# Patient Record
Sex: FEMALE | Race: WHITE | Employment: FULL TIME | URBAN - METROPOLITAN AREA
[De-identification: names, ages, dates, MRNs, and addresses within clinical notes are randomized per-mention and may not be internally consistent; named-entity substitution may affect disease eponyms.]

---

## 2017-01-11 RX ORDER — SERTRALINE HYDROCHLORIDE 25 MG/1
25 TABLET, FILM COATED ORAL DAILY
Qty: 30 TABLET | Refills: 0 | Status: SHIPPED | OUTPATIENT
Start: 2017-01-11 | End: 2017-03-06

## 2017-01-11 RX ORDER — LEVOTHYROXINE SODIUM 0.1 MG/1
100 TABLET ORAL
Qty: 30 TABLET | Refills: 0 | Status: SHIPPED | OUTPATIENT
Start: 2017-01-11 | End: 2017-01-17 | Stop reason: DRUGHIGH

## 2017-01-11 RX ORDER — SIMVASTATIN 40 MG
60 TABLET ORAL NIGHTLY
Qty: 45 TABLET | Refills: 0 | Status: SHIPPED | OUTPATIENT
Start: 2017-01-11 | End: 2017-01-17

## 2017-01-11 NOTE — TELEPHONE ENCOUNTER
From: Terri Alfred  To: Vika Batista MD  Sent: 1/11/2017 11:40 AM CST  Subject: Medication Renewal Request    Original authorizing provider: MD Terri Peacock would like a refill of the following medications:  simvastatin 40 MG Ora

## 2017-01-12 ENCOUNTER — APPOINTMENT (OUTPATIENT)
Dept: LAB | Age: 57
End: 2017-01-12
Attending: INTERNAL MEDICINE
Payer: COMMERCIAL

## 2017-01-12 PROCEDURE — 36415 COLL VENOUS BLD VENIPUNCTURE: CPT | Performed by: INTERNAL MEDICINE

## 2017-01-12 PROCEDURE — 85025 COMPLETE CBC W/AUTO DIFF WBC: CPT | Performed by: INTERNAL MEDICINE

## 2017-01-12 PROCEDURE — 84443 ASSAY THYROID STIM HORMONE: CPT | Performed by: INTERNAL MEDICINE

## 2017-01-12 PROCEDURE — 80061 LIPID PANEL: CPT | Performed by: INTERNAL MEDICINE

## 2017-01-12 PROCEDURE — 81001 URINALYSIS AUTO W/SCOPE: CPT | Performed by: INTERNAL MEDICINE

## 2017-01-12 PROCEDURE — 80053 COMPREHEN METABOLIC PANEL: CPT | Performed by: INTERNAL MEDICINE

## 2017-01-15 ENCOUNTER — TELEPHONE (OUTPATIENT)
Dept: NEPHROLOGY | Facility: CLINIC | Age: 57
End: 2017-01-15

## 2017-01-15 DIAGNOSIS — E03.9 HYPOTHYROIDISM, UNSPECIFIED TYPE: Primary | ICD-10-CM

## 2017-01-15 DIAGNOSIS — E78.00 PURE HYPERCHOLESTEROLEMIA: ICD-10-CM

## 2017-01-15 NOTE — TELEPHONE ENCOUNTER
Labs are okay except thyroid is a little underactive and cholesterol is too high. Increase Synthroid to 112 µg, #30, refills 5.  DC simvastatin. Start Lipitor 40 mg 1 daily, #30, refills 5. TSH, liver and lipid panel in 1 month. Follow-up as scheduled.

## 2017-01-17 RX ORDER — LEVOTHYROXINE SODIUM 112 UG/1
112 TABLET ORAL
Qty: 30 TABLET | Refills: 5 | Status: SHIPPED | OUTPATIENT
Start: 2017-01-17 | End: 2017-03-06

## 2017-01-17 RX ORDER — ATORVASTATIN CALCIUM 40 MG/1
40 TABLET, FILM COATED ORAL NIGHTLY
Qty: 30 TABLET | Refills: 5 | Status: SHIPPED | OUTPATIENT
Start: 2017-01-17 | End: 2017-03-06

## 2017-01-17 NOTE — TELEPHONE ENCOUNTER
Patient returned call. Results message read. Patient is aware of dose increase for Levothyroxine to 112mcg. She also is aware that Dr. Kristy Fontenot is stopping Simvastatin and switching to Lipitor 40mg daily.  Lab work ordered and patient knows to do this lab wo

## 2017-01-25 ENCOUNTER — OFFICE VISIT (OUTPATIENT)
Dept: NEPHROLOGY | Facility: CLINIC | Age: 57
End: 2017-01-25

## 2017-01-25 VITALS
WEIGHT: 210.38 LBS | DIASTOLIC BLOOD PRESSURE: 74 MMHG | HEART RATE: 81 BPM | HEIGHT: 67 IN | BODY MASS INDEX: 33.02 KG/M2 | SYSTOLIC BLOOD PRESSURE: 107 MMHG

## 2017-01-25 DIAGNOSIS — Z00.00 ROUTINE GENERAL MEDICAL EXAMINATION AT HEALTH CARE FACILITY: ICD-10-CM

## 2017-01-25 DIAGNOSIS — E03.9 ACQUIRED HYPOTHYROIDISM: ICD-10-CM

## 2017-01-25 DIAGNOSIS — E78.00 HYPERCHOLESTEREMIA: ICD-10-CM

## 2017-01-25 DIAGNOSIS — E04.1 THYROID NODULE: Primary | ICD-10-CM

## 2017-01-25 PROCEDURE — 99215 OFFICE O/P EST HI 40 MIN: CPT | Performed by: INTERNAL MEDICINE

## 2017-01-25 PROCEDURE — 99212 OFFICE O/P EST SF 10 MIN: CPT | Performed by: INTERNAL MEDICINE

## 2017-01-26 NOTE — PROGRESS NOTES
HPI:    Patient ID: Winston Morales is a 61year old female.     HPI Comments: 48-year-old female with a history of hypothyroidism, hypercholesterolemia, anxiety, history of quiescent ulcerative colitis and a right thyroid nodule who is now here for follow-u Musculoskeletal: She exhibits no tenderness. Lymphadenopathy:     She has no cervical adenopathy. Neurological: She is alert and oriented to person, place, and time. Skin: Skin is warm and dry. Psychiatric: She has a normal mood and affect.  Her b

## 2017-02-04 ENCOUNTER — HOSPITAL ENCOUNTER (OUTPATIENT)
Dept: ULTRASOUND IMAGING | Age: 57
Discharge: HOME | End: 2017-02-04
Attending: INTERNAL MEDICINE
Payer: COMMERCIAL

## 2017-02-04 DIAGNOSIS — E04.1 THYROID NODULE: ICD-10-CM

## 2017-02-04 PROCEDURE — 76536 US EXAM OF HEAD AND NECK: CPT

## 2017-03-06 RX ORDER — SERTRALINE HYDROCHLORIDE 25 MG/1
25 TABLET, FILM COATED ORAL DAILY
Qty: 30 TABLET | Refills: 5 | Status: SHIPPED | OUTPATIENT
Start: 2017-03-06 | End: 2017-06-16

## 2017-03-06 RX ORDER — ATORVASTATIN CALCIUM 40 MG/1
40 TABLET, FILM COATED ORAL NIGHTLY
Qty: 30 TABLET | Refills: 5 | Status: SHIPPED | OUTPATIENT
Start: 2017-03-06 | End: 2017-06-16

## 2017-03-06 RX ORDER — LEVOTHYROXINE SODIUM 112 UG/1
112 TABLET ORAL
Qty: 30 TABLET | Refills: 5 | Status: SHIPPED | OUTPATIENT
Start: 2017-03-06 | End: 2017-06-16

## 2017-03-06 NOTE — TELEPHONE ENCOUNTER
From: Peter Tolliver  To: Melissa Martinez MD  Sent: 3/6/2017 12:43 PM CST  Subject: Medication Renewal Request    Original authorizing provider: MD Peter Damian would like a refill of the following medications:  Sertraline HCl (ZOLOFT

## 2017-05-28 ENCOUNTER — HOSPITAL ENCOUNTER (OUTPATIENT)
Facility: HOSPITAL | Age: 57
Setting detail: OBSERVATION
Discharge: HOME | End: 2017-05-29
Attending: EMERGENCY MEDICINE | Admitting: HOSPITALIST
Payer: COMMERCIAL

## 2017-05-28 ENCOUNTER — APPOINTMENT (OUTPATIENT)
Dept: GENERAL RADIOLOGY | Facility: HOSPITAL | Age: 57
End: 2017-05-28
Attending: EMERGENCY MEDICINE
Payer: COMMERCIAL

## 2017-05-28 DIAGNOSIS — R07.9 ACUTE CHEST PAIN: Primary | ICD-10-CM

## 2017-05-28 PROCEDURE — 71010 XR CHEST AP/PA (1 VIEW) (CPT=71010): CPT | Performed by: EMERGENCY MEDICINE

## 2017-05-28 PROCEDURE — 99219 INITIAL OBSERVATION CARE,LEVL II: CPT | Performed by: HOSPITALIST

## 2017-05-28 RX ORDER — ACETAMINOPHEN 325 MG/1
650 TABLET ORAL EVERY 6 HOURS PRN
Status: DISCONTINUED | OUTPATIENT
Start: 2017-05-28 | End: 2017-05-29

## 2017-05-28 RX ORDER — ATORVASTATIN CALCIUM 40 MG/1
40 TABLET, FILM COATED ORAL NIGHTLY
Status: DISCONTINUED | OUTPATIENT
Start: 2017-05-28 | End: 2017-05-29

## 2017-05-28 RX ORDER — PANTOPRAZOLE SODIUM 40 MG/1
40 TABLET, DELAYED RELEASE ORAL
Status: DISCONTINUED | OUTPATIENT
Start: 2017-05-29 | End: 2017-05-29

## 2017-05-28 RX ORDER — MAGNESIUM HYDROXIDE/ALUMINUM HYDROXICE/SIMETHICONE 120; 1200; 1200 MG/30ML; MG/30ML; MG/30ML
30 SUSPENSION ORAL 4 TIMES DAILY PRN
Status: DISCONTINUED | OUTPATIENT
Start: 2017-05-28 | End: 2017-05-29

## 2017-05-28 RX ORDER — ASPIRIN 325 MG
325 TABLET ORAL ONCE
Status: COMPLETED | OUTPATIENT
Start: 2017-05-28 | End: 2017-05-28

## 2017-05-28 RX ORDER — MORPHINE SULFATE 4 MG/ML
4 INJECTION, SOLUTION INTRAMUSCULAR; INTRAVENOUS EVERY 2 HOUR PRN
Status: DISCONTINUED | OUTPATIENT
Start: 2017-05-28 | End: 2017-05-29

## 2017-05-28 RX ORDER — LEVOTHYROXINE SODIUM 112 UG/1
112 TABLET ORAL
Status: DISCONTINUED | OUTPATIENT
Start: 2017-05-29 | End: 2017-05-29

## 2017-05-28 RX ORDER — ONDANSETRON 2 MG/ML
4 INJECTION INTRAMUSCULAR; INTRAVENOUS EVERY 6 HOURS PRN
Status: DISCONTINUED | OUTPATIENT
Start: 2017-05-28 | End: 2017-05-29

## 2017-05-28 RX ORDER — MORPHINE SULFATE 2 MG/ML
2 INJECTION, SOLUTION INTRAMUSCULAR; INTRAVENOUS EVERY 2 HOUR PRN
Status: DISCONTINUED | OUTPATIENT
Start: 2017-05-28 | End: 2017-05-29

## 2017-05-28 RX ORDER — ENOXAPARIN SODIUM 100 MG/ML
40 INJECTION SUBCUTANEOUS DAILY
Status: DISCONTINUED | OUTPATIENT
Start: 2017-05-28 | End: 2017-05-29

## 2017-05-28 RX ORDER — HYDROCODONE BITARTRATE AND ACETAMINOPHEN 5; 325 MG/1; MG/1
1 TABLET ORAL EVERY 6 HOURS PRN
Status: DISCONTINUED | OUTPATIENT
Start: 2017-05-28 | End: 2017-05-29

## 2017-05-28 NOTE — ED INITIAL ASSESSMENT (HPI)
Pt also complains of shortness of breath while going up the stairs. At triage she complained of some discomfort over the right neck area.

## 2017-05-28 NOTE — PLAN OF CARE
Patient/Family Goals    • Patient/Family Short Term Goal Progressing        Pt oriented to room; call light in reach; reviewed POC and medications with pt; pt up ambulating; denies any pain at this time.  Will continue to monitor

## 2017-05-28 NOTE — ED INITIAL ASSESSMENT (HPI)
Pt states that 3 days ago while cutting grass she had a sudden onset of palpitations with associated tiredness that went away. This happened again the day after while gardening. The same complaints happened today while doing the laundry.

## 2017-05-28 NOTE — PROGRESS NOTES
Pt seen in the ED rm 21. Admission process started. Floor RN to F/U with skin check and admission orders. 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    Pt began with s/s starting on Friday with intermittent chest discomfort, sob,

## 2017-05-28 NOTE — H&P
[facility] [place] [facility] - [facility]    History & Physical    Sergei Balderas Patient Status:  Emergency    10/17/1960 MRN [id number]   Location [address] Attending Leanna Ahuja MD   Hosp Day # 0 PCP Shoshana Wiley MD     Date:  20 Maternal Grandmother    • Other[other] [OTHER] Maternal Grandfather    • Other[other] [OTHER] Paternal Grandmother    • Other[other] [OTHER] Paternal Grandfather       reports that she has never smoked.  She does not have any smokeless tobacco history on fi lymphadenopathy, no thyromegaly. Respiratory:  Lungs are clear to auscultation, respirations are non-labored, breath sounds are equal, symmetrical chest wall expansion. Cardiovascular:  Normal rate, regular rhythm, no murmur, no edema.   Gastrointestinal:

## 2017-05-28 NOTE — ED PROVIDER NOTES
Patient Seen in: [facility] AND [facility] Emergency Department    History   Patient presents with:  Chest Pain Angina (cardiovascular)    Stated Complaint:     HPI    Patient is a 25-year-old female that states for the last 3 days when she exerts herself she ge Father 39     Cardiac stents   • Crohn's Disease Mother       at age 61 (cause of death)   • Thyroid disease Other      close relative   • Heart Disease Other      Close relative   • Other[other] [OTHER] Maternal Grandmother    • Michael PEARL Musculoskeletal: Normal range of motion. Exhibits no edema or tenderness. Lymphadenopathy: No cervical adenopathy. Neurological: Alert and oriented to person, place, and time. Normal reflexes. No cranial nerve deficit.  No motor os sensory defecits no specified.     Medications Prescribed:  Current Discharge Medication List        Present on Admission  Date Reviewed: 9/19/2014          ICD-10-CM Noted POA    Acute chest pain R07.9 5/28/2017 Unknown

## 2017-05-29 ENCOUNTER — APPOINTMENT (OUTPATIENT)
Dept: CV DIAGNOSTICS | Facility: HOSPITAL | Age: 57
End: 2017-05-29
Attending: HOSPITALIST
Payer: COMMERCIAL

## 2017-05-29 ENCOUNTER — APPOINTMENT (OUTPATIENT)
Dept: NUCLEAR MEDICINE | Facility: HOSPITAL | Age: 57
End: 2017-05-29
Attending: HOSPITALIST
Payer: COMMERCIAL

## 2017-05-29 VITALS
BODY MASS INDEX: 32.33 KG/M2 | RESPIRATION RATE: 16 BRPM | SYSTOLIC BLOOD PRESSURE: 134 MMHG | HEIGHT: 67 IN | HEART RATE: 91 BPM | WEIGHT: 206 LBS | TEMPERATURE: 98 F | DIASTOLIC BLOOD PRESSURE: 79 MMHG | OXYGEN SATURATION: 99 %

## 2017-05-29 PROCEDURE — 93017 CV STRESS TEST TRACING ONLY: CPT | Performed by: HOSPITALIST

## 2017-05-29 PROCEDURE — 78452 HT MUSCLE IMAGE SPECT MULT: CPT | Performed by: HOSPITALIST

## 2017-05-29 RX ORDER — 0.9 % SODIUM CHLORIDE 0.9 %
VIAL (ML) INJECTION
Status: DISCONTINUED
Start: 2017-05-29 | End: 2017-05-29

## 2017-05-29 RX ORDER — 0.9 % SODIUM CHLORIDE 0.9 %
VIAL (ML) INJECTION
Status: COMPLETED
Start: 2017-05-29 | End: 2017-05-29

## 2017-05-30 ENCOUNTER — TELEPHONE (OUTPATIENT)
Dept: INTERNAL MEDICINE UNIT | Facility: HOSPITAL | Age: 57
End: 2017-05-30

## 2017-05-30 NOTE — TELEPHONE ENCOUNTER
LMTCB. PAAs--please assist patient to schedule a hospital follow up appointment with Dr. Madisyn Smith if she calls back. Ok to use any available time slot.

## 2017-05-30 NOTE — TELEPHONE ENCOUNTER
Patient discharged from [facility] on May 29, 2017. Please call patient to schedule hospital follow-up appointment with PCP, Dr. Rosibel Tirado.

## 2017-06-02 NOTE — TELEPHONE ENCOUNTER
LMTCB. PAAs--see message below. Please assist patient to schedule a hospital follow up visit with Dr. Jennifer Viera.

## 2017-06-09 ENCOUNTER — OFFICE VISIT (OUTPATIENT)
Dept: NEPHROLOGY | Facility: CLINIC | Age: 57
End: 2017-06-09

## 2017-06-09 VITALS
DIASTOLIC BLOOD PRESSURE: 81 MMHG | BODY MASS INDEX: 33 KG/M2 | WEIGHT: 208 LBS | SYSTOLIC BLOOD PRESSURE: 132 MMHG | HEART RATE: 82 BPM | RESPIRATION RATE: 20 BRPM

## 2017-06-09 DIAGNOSIS — J30.2 SEASONAL ALLERGIC RHINITIS, UNSPECIFIED ALLERGIC RHINITIS TRIGGER: ICD-10-CM

## 2017-06-09 DIAGNOSIS — E78.00 HYPERCHOLESTEREMIA: Primary | ICD-10-CM

## 2017-06-09 DIAGNOSIS — E03.9 ACQUIRED HYPOTHYROIDISM: ICD-10-CM

## 2017-06-09 PROCEDURE — 99214 OFFICE O/P EST MOD 30 MIN: CPT | Performed by: INTERNAL MEDICINE

## 2017-06-09 PROCEDURE — 99212 OFFICE O/P EST SF 10 MIN: CPT | Performed by: INTERNAL MEDICINE

## 2017-06-09 RX ORDER — ALBUTEROL SULFATE 90 UG/1
2 AEROSOL, METERED RESPIRATORY (INHALATION) EVERY 6 HOURS PRN
Qty: 1 INHALER | Refills: 2 | Status: SHIPPED | OUTPATIENT
Start: 2017-06-09 | End: 2019-06-21

## 2017-06-09 NOTE — PROGRESS NOTES
HPI:    Patient ID: Jayna Eagle is a 61year old female.     HPI Comments: 50-year-old female with a history of hypothyroidism, hypercholesterolemia, anxiety, history of quiescent ulcerative colitis and a right thyroid nodule who is now here for follow-u Disp: 30 tablet Rfl: 5   Multiple Vitamin (MULTIVITAMINS OR) Take 500 tablets by mouth.  Disp:  Rfl:      Allergies:  Penicillin G Procai*    Anaphylaxis  Tetanus-Diphtheria *    Palpitations   PHYSICAL EXAM:   Physical Exam    Constitutional: She appears w

## 2017-06-16 RX ORDER — ATORVASTATIN CALCIUM 40 MG/1
40 TABLET, FILM COATED ORAL NIGHTLY
Qty: 30 TABLET | Refills: 5 | Status: SHIPPED | OUTPATIENT
Start: 2017-06-16 | End: 2017-09-28

## 2017-06-16 RX ORDER — SERTRALINE HYDROCHLORIDE 25 MG/1
25 TABLET, FILM COATED ORAL DAILY
Qty: 30 TABLET | Refills: 5 | Status: SHIPPED | OUTPATIENT
Start: 2017-06-16 | End: 2017-09-28

## 2017-06-16 RX ORDER — LEVOTHYROXINE SODIUM 112 UG/1
112 TABLET ORAL
Qty: 30 TABLET | Refills: 5 | Status: SHIPPED | OUTPATIENT
Start: 2017-06-16 | End: 2017-09-28

## 2017-06-16 NOTE — TELEPHONE ENCOUNTER
From: Aris Miller  To: Ashley Brown MD  Sent: 6/16/2017 10:24 AM CDT  Subject: Medication Renewal Request    Original authorizing provider: MD Aris Bear would like a refill of the following medications:  Sertraline HCl (Deepti Stain

## 2017-09-08 ENCOUNTER — APPOINTMENT (OUTPATIENT)
Dept: LAB | Age: 57
End: 2017-09-08
Attending: INTERNAL MEDICINE
Payer: COMMERCIAL

## 2017-09-08 DIAGNOSIS — E78.00 HYPERCHOLESTEREMIA: ICD-10-CM

## 2017-09-08 LAB
ALBUMIN SERPL BCP-MCNC: 4.2 G/DL (ref 3.5–4.8)
ALBUMIN/GLOB SERPL: 1.4 {RATIO} (ref 1–2)
ALP SERPL-CCNC: 81 U/L (ref 32–100)
ALT SERPL-CCNC: 31 U/L (ref 14–54)
ANION GAP SERPL CALC-SCNC: 8 MMOL/L (ref 0–18)
AST SERPL-CCNC: 26 U/L (ref 15–41)
BILIRUB SERPL-MCNC: 1.2 MG/DL (ref 0.3–1.2)
BUN SERPL-MCNC: 9 MG/DL (ref 8–20)
BUN/CREAT SERPL: 12 (ref 10–20)
CALCIUM SERPL-MCNC: 9.5 MG/DL (ref 8.5–10.5)
CHLORIDE SERPL-SCNC: 106 MMOL/L (ref 95–110)
CHOLEST SERPL-MCNC: 181 MG/DL (ref 110–200)
CO2 SERPL-SCNC: 25 MMOL/L (ref 22–32)
CREAT SERPL-MCNC: 0.75 MG/DL (ref 0.5–1.5)
GLOBULIN PLAS-MCNC: 3 G/DL (ref 2.5–3.7)
GLUCOSE SERPL-MCNC: 87 MG/DL (ref 70–99)
HDLC SERPL-MCNC: 49 MG/DL
LDLC SERPL CALC-MCNC: 81 MG/DL (ref 0–99)
NONHDLC SERPL-MCNC: 132 MG/DL
OSMOLALITY UR CALC.SUM OF ELEC: 286 MOSM/KG (ref 275–295)
POTASSIUM SERPL-SCNC: 4 MMOL/L (ref 3.3–5.1)
PROT SERPL-MCNC: 7.2 G/DL (ref 5.9–8.4)
SODIUM SERPL-SCNC: 139 MMOL/L (ref 136–144)
TRIGL SERPL-MCNC: 255 MG/DL (ref 1–149)

## 2017-09-08 PROCEDURE — 36415 COLL VENOUS BLD VENIPUNCTURE: CPT

## 2017-09-08 PROCEDURE — 80053 COMPREHEN METABOLIC PANEL: CPT

## 2017-09-08 PROCEDURE — 80061 LIPID PANEL: CPT

## 2017-09-28 RX ORDER — ATORVASTATIN CALCIUM 40 MG/1
40 TABLET, FILM COATED ORAL NIGHTLY
Qty: 30 TABLET | Refills: 5 | Status: SHIPPED
Start: 2017-09-28 | End: 2017-10-06

## 2017-09-28 RX ORDER — SERTRALINE HYDROCHLORIDE 25 MG/1
25 TABLET, FILM COATED ORAL DAILY
Qty: 30 TABLET | Refills: 5 | Status: SHIPPED
Start: 2017-09-28 | End: 2017-10-06

## 2017-09-28 RX ORDER — LEVOTHYROXINE SODIUM 112 UG/1
112 TABLET ORAL
Qty: 30 TABLET | Refills: 5 | Status: SHIPPED
Start: 2017-09-28 | End: 2017-10-06

## 2017-09-28 NOTE — TELEPHONE ENCOUNTER
From: Audrey Lin  Sent: 9/28/2017 3:42 PM CDT  Subject: Medication Renewal Request    Lindsey Travis would like a refill of the following medications:     Sertraline HCl (ZOLOFT) 25 MG Oral Tab Kay Pimentel MD]   Patient Comment: 90 day supply

## 2017-10-06 RX ORDER — ATORVASTATIN CALCIUM 40 MG/1
40 TABLET, FILM COATED ORAL NIGHTLY
Qty: 90 TABLET | Refills: 0 | Status: SHIPPED | OUTPATIENT
Start: 2017-10-06 | End: 2018-03-27

## 2017-10-06 RX ORDER — SERTRALINE HYDROCHLORIDE 25 MG/1
25 TABLET, FILM COATED ORAL DAILY
Qty: 90 TABLET | Refills: 0 | Status: SHIPPED | OUTPATIENT
Start: 2017-10-06 | End: 2018-03-27

## 2017-10-06 RX ORDER — LEVOTHYROXINE SODIUM 112 UG/1
112 TABLET ORAL
Qty: 90 TABLET | Refills: 0 | Status: SHIPPED | OUTPATIENT
Start: 2017-10-06 | End: 2018-03-27

## 2017-10-06 NOTE — TELEPHONE ENCOUNTER
Patient requesting a 90 days supply    Current Outpatient Prescriptions:  Sertraline HCl (ZOLOFT) 25 MG Oral Tab Take 1 tablet (25 mg total) by mouth daily. Disp: 30 tablet Rfl: 5   atorvastatin 40 MG Oral Tab Take 1 tablet (40 mg total) by mouth nightly.

## 2017-10-06 NOTE — TELEPHONE ENCOUNTER
Refilled on 9/28/17 but patient is requesting a 90 day supply. Originally refilled for #30 + 5 refills.

## 2018-03-27 DIAGNOSIS — E78.00 PURE HYPERCHOLESTEROLEMIA: Primary | ICD-10-CM

## 2018-03-27 DIAGNOSIS — E03.9 ACQUIRED HYPOTHYROIDISM: ICD-10-CM

## 2018-03-28 RX ORDER — SERTRALINE HYDROCHLORIDE 25 MG/1
25 TABLET, FILM COATED ORAL DAILY
Qty: 90 TABLET | Refills: 0 | Status: SHIPPED | OUTPATIENT
Start: 2018-03-28 | End: 2018-08-15

## 2018-03-28 RX ORDER — ATORVASTATIN CALCIUM 40 MG/1
40 TABLET, FILM COATED ORAL NIGHTLY
Qty: 90 TABLET | Refills: 0 | Status: SHIPPED | OUTPATIENT
Start: 2018-03-28 | End: 2018-08-15

## 2018-03-28 RX ORDER — LEVOTHYROXINE SODIUM 112 UG/1
112 TABLET ORAL
Qty: 90 TABLET | Refills: 0 | Status: SHIPPED | OUTPATIENT
Start: 2018-03-28 | End: 2018-08-15

## 2018-03-28 NOTE — TELEPHONE ENCOUNTER
From: March Nancy  Sent: 3/27/2018 9:54 PM CDT  Subject: Medication Renewal Request    Lindsey Travis would like a refill of the following medications:     Levothyroxine Sodium 112 MCG Oral Tab Darwin Linn MD]     atorvastatin 40 MG Oral Tab [facility]

## 2018-03-29 NOTE — TELEPHONE ENCOUNTER
Patient contacted. Advised of lab work ordered by Dr. Heather Luna (instructed to fast 12 hours) Patient is aware to schedule a follow up but will call after she does lab work as she has to coordinate her work schedule with Dr. Codie Cuevas schedule.

## 2018-07-05 ENCOUNTER — TELEPHONE (OUTPATIENT)
Dept: OPHTHALMOLOGY | Facility: CLINIC | Age: 58
End: 2018-07-05

## 2018-07-05 ENCOUNTER — TELEPHONE (OUTPATIENT)
Dept: NEPHROLOGY | Facility: CLINIC | Age: 58
End: 2018-07-05

## 2018-07-05 DIAGNOSIS — H33.20: Primary | ICD-10-CM

## 2018-07-05 NOTE — TELEPHONE ENCOUNTER
Pt calling to schedule appointment for detached retina. Offered pt next available on 7/7. Pt declined states she does not want to wait that long. Please advise. Thank you.

## 2018-07-05 NOTE — TELEPHONE ENCOUNTER
Pt called back. The doctor she originally requested is on vacation so she's going to look into who else is in her network that specializes in retinas. Still waiting for approval from [address].

## 2018-07-05 NOTE — TELEPHONE ENCOUNTER
The referral was sent to Dr. Sharon Castro as high priority. [facility] will get insurance authorization and process it once Dr. Sharon Castro signs it.

## 2018-07-05 NOTE — TELEPHONE ENCOUNTER
Patient talked to Dr. Wagner Moon and he told her to see Dr. Karen Groves. Referral sent as STAT. Appointment is scheduled for tomorrow (Friday) 7/6/18. She states she has about 30 floaters in her eye and a dark veil over her eye. Referral sent as STAT.

## 2018-07-05 NOTE — TELEPHONE ENCOUNTER
OK but how does she know she has a detached retina. She should not wait too long looking around for an eye doctor. Can refer to Dr. Rachel Alas or his grp if covered by her insurance.

## 2018-07-05 NOTE — TELEPHONE ENCOUNTER
Pt called to request referral to Dr. Eldon Santiago due to detached retina. No appt scheduled. Please call. Aware [facility] out of office today.

## 2018-07-20 ENCOUNTER — LAB ENCOUNTER (OUTPATIENT)
Dept: LAB | Age: 58
End: 2018-07-20
Attending: INTERNAL MEDICINE
Payer: COMMERCIAL

## 2018-07-20 DIAGNOSIS — E78.00 PURE HYPERCHOLESTEROLEMIA: ICD-10-CM

## 2018-07-20 DIAGNOSIS — E03.9 ACQUIRED HYPOTHYROIDISM: ICD-10-CM

## 2018-07-20 LAB
ALBUMIN SERPL BCP-MCNC: 4.3 G/DL (ref 3.5–4.8)
ALBUMIN/GLOB SERPL: 1.4 {RATIO} (ref 1–2)
ALP SERPL-CCNC: 82 U/L (ref 32–100)
ALT SERPL-CCNC: 30 U/L (ref 14–54)
ANION GAP SERPL CALC-SCNC: 7 MMOL/L (ref 0–18)
AST SERPL-CCNC: 24 U/L (ref 15–41)
BASOPHILS # BLD: 0.1 K/UL (ref 0–0.2)
BASOPHILS NFR BLD: 1 %
BILIRUB SERPL-MCNC: 1.2 MG/DL (ref 0.3–1.2)
BILIRUB UR QL: NEGATIVE
BUN SERPL-MCNC: 8 MG/DL (ref 8–20)
BUN/CREAT SERPL: 10.5 (ref 10–20)
CALCIUM SERPL-MCNC: 10 MG/DL (ref 8.5–10.5)
CHLORIDE SERPL-SCNC: 105 MMOL/L (ref 95–110)
CHOLEST SERPL-MCNC: 180 MG/DL (ref 110–200)
CO2 SERPL-SCNC: 27 MMOL/L (ref 22–32)
COLOR UR: YELLOW
CREAT SERPL-MCNC: 0.76 MG/DL (ref 0.5–1.5)
EOSINOPHIL # BLD: 0.1 K/UL (ref 0–0.7)
EOSINOPHIL NFR BLD: 2 %
ERYTHROCYTE [DISTWIDTH] IN BLOOD BY AUTOMATED COUNT: 13.7 % (ref 11–15)
GLOBULIN PLAS-MCNC: 3 G/DL (ref 2.5–3.7)
GLUCOSE SERPL-MCNC: 87 MG/DL (ref 70–99)
GLUCOSE UR-MCNC: NEGATIVE MG/DL
HCT VFR BLD AUTO: 44.4 % (ref 35–48)
HDLC SERPL-MCNC: 57 MG/DL
HGB BLD-MCNC: 14.7 G/DL (ref 12–16)
KETONES UR-MCNC: NEGATIVE MG/DL
LDLC SERPL CALC-MCNC: 82 MG/DL (ref 0–99)
LYMPHOCYTES # BLD: 1.9 K/UL (ref 1–4)
LYMPHOCYTES NFR BLD: 23 %
MCH RBC QN AUTO: 30.1 PG (ref 27–32)
MCHC RBC AUTO-ENTMCNC: 33.1 G/DL (ref 32–37)
MCV RBC AUTO: 91 FL (ref 80–100)
MONOCYTES # BLD: 0.6 K/UL (ref 0–1)
MONOCYTES NFR BLD: 8 %
NEUTROPHILS # BLD AUTO: 5.5 K/UL (ref 1.8–7.7)
NEUTROPHILS NFR BLD: 67 %
NITRITE UR QL STRIP.AUTO: NEGATIVE
NONHDLC SERPL-MCNC: 123 MG/DL
OSMOLALITY UR CALC.SUM OF ELEC: 286 MOSM/KG (ref 275–295)
PATIENT FASTING: YES
PH UR: 7 [PH] (ref 5–8)
PLATELET # BLD AUTO: 364 K/UL (ref 140–400)
PMV BLD AUTO: 8.4 FL (ref 7.4–10.3)
POTASSIUM SERPL-SCNC: 4.3 MMOL/L (ref 3.3–5.1)
PROT SERPL-MCNC: 7.3 G/DL (ref 5.9–8.4)
PROT UR-MCNC: NEGATIVE MG/DL
RBC # BLD AUTO: 4.88 M/UL (ref 3.7–5.4)
RBC #/AREA URNS AUTO: 2 /HPF
SODIUM SERPL-SCNC: 139 MMOL/L (ref 136–144)
SP GR UR STRIP: 1.01 (ref 1–1.03)
TRIGL SERPL-MCNC: 206 MG/DL (ref 1–149)
TSH SERPL-ACNC: 4.17 UIU/ML (ref 0.45–5.33)
UROBILINOGEN UR STRIP-ACNC: <2
VIT C UR-MCNC: NEGATIVE MG/DL
WBC # BLD AUTO: 8.2 K/UL (ref 4–11)
WBC #/AREA URNS AUTO: 15 /HPF

## 2018-07-20 PROCEDURE — 84443 ASSAY THYROID STIM HORMONE: CPT

## 2018-07-20 PROCEDURE — 81001 URINALYSIS AUTO W/SCOPE: CPT

## 2018-07-20 PROCEDURE — 80061 LIPID PANEL: CPT

## 2018-07-20 PROCEDURE — 80053 COMPREHEN METABOLIC PANEL: CPT

## 2018-07-20 PROCEDURE — 85025 COMPLETE CBC W/AUTO DIFF WBC: CPT

## 2018-07-20 PROCEDURE — 36415 COLL VENOUS BLD VENIPUNCTURE: CPT

## 2018-08-16 RX ORDER — LEVOTHYROXINE SODIUM 112 UG/1
TABLET ORAL
Qty: 90 TABLET | Refills: 0 | Status: SHIPPED | OUTPATIENT
Start: 2018-08-16 | End: 2018-11-28

## 2018-08-16 RX ORDER — SERTRALINE HYDROCHLORIDE 25 MG/1
TABLET, FILM COATED ORAL
Qty: 90 TABLET | Refills: 0 | Status: SHIPPED | OUTPATIENT
Start: 2018-08-16 | End: 2018-11-06

## 2018-08-16 RX ORDER — ATORVASTATIN CALCIUM 40 MG/1
TABLET, FILM COATED ORAL
Qty: 90 TABLET | Refills: 0 | Status: SHIPPED | OUTPATIENT
Start: 2018-08-16 | End: 2018-11-28

## 2018-08-16 NOTE — TELEPHONE ENCOUNTER
LOV 6/9/17. No upcoming appointment has been scheduled. Last lipid panel was done on 7/20/18. Refills pended and routed to Dr. Nick Gibson.

## 2018-10-11 ENCOUNTER — TELEPHONE (OUTPATIENT)
Dept: NEPHROLOGY | Facility: CLINIC | Age: 58
End: 2018-10-11

## 2018-11-06 ENCOUNTER — OFFICE VISIT (OUTPATIENT)
Dept: NEPHROLOGY | Facility: CLINIC | Age: 58
End: 2018-11-06

## 2018-11-06 VITALS
HEIGHT: 67 IN | DIASTOLIC BLOOD PRESSURE: 71 MMHG | BODY MASS INDEX: 32.86 KG/M2 | SYSTOLIC BLOOD PRESSURE: 103 MMHG | WEIGHT: 209.38 LBS

## 2018-11-06 DIAGNOSIS — E78.00 HYPERCHOLESTEREMIA: ICD-10-CM

## 2018-11-06 DIAGNOSIS — E03.9 ACQUIRED HYPOTHYROIDISM: ICD-10-CM

## 2018-11-06 DIAGNOSIS — Z00.00 ROUTINE GENERAL MEDICAL EXAMINATION AT HEALTH CARE FACILITY: Primary | ICD-10-CM

## 2018-11-06 PROCEDURE — 99212 OFFICE O/P EST SF 10 MIN: CPT | Performed by: INTERNAL MEDICINE

## 2018-11-06 PROCEDURE — 99215 OFFICE O/P EST HI 40 MIN: CPT | Performed by: INTERNAL MEDICINE

## 2018-11-07 NOTE — PROGRESS NOTES
[facility]  Nephrology Daily Progress Note    Derrel Boxer  [id number]  60year old  Patient presents with: Annual      HPI:   Derrel Boxer is a 60year old female.   65-year-old female with a history of hypothyroidism, hypercholesterolemia, anxie auscultation bilaterally normal respiratory effort  Cardiovascular: regular rate and rhythm no murmurs, gallups, or rubs  Abdomen: soft non-tender non-distended no organomegaly noted no masses  Musculoskeletal: full ROM all extremities good strength  no de pain     Routine general medical examination at health care facility     Hypercholesteremia     Acquired hypothyroidism     Thyroid nodule     Acute chest pain     Seasonal allergic rhinitis      Overall appears to be stable. Weight is stable as well.   He

## 2018-11-28 RX ORDER — ATORVASTATIN CALCIUM 40 MG/1
40 TABLET, FILM COATED ORAL NIGHTLY
Qty: 90 TABLET | Refills: 1 | Status: SHIPPED | OUTPATIENT
Start: 2018-11-28 | End: 2019-08-17

## 2018-11-28 RX ORDER — LEVOTHYROXINE SODIUM 112 UG/1
112 TABLET ORAL
Qty: 90 TABLET | Refills: 3 | Status: SHIPPED | OUTPATIENT
Start: 2018-11-28 | End: 2019-08-17

## 2019-06-21 RX ORDER — ALBUTEROL SULFATE 90 UG/1
2 AEROSOL, METERED RESPIRATORY (INHALATION) EVERY 6 HOURS PRN
Qty: 1 INHALER | Refills: 2 | Status: SHIPPED | OUTPATIENT
Start: 2019-06-21 | End: 2020-09-23

## 2019-06-21 NOTE — TELEPHONE ENCOUNTER
LOV 11/6/18. RTC in 1 yr (11/2019) Prescription refilled 1 time per written protocol in Dr. Tatiana Abel absence from the office this week.

## 2019-06-27 ENCOUNTER — PATIENT OUTREACH (OUTPATIENT)
Dept: CASE MANAGEMENT | Age: 59
End: 2019-06-27

## 2019-08-17 DIAGNOSIS — E78.00 HYPERCHOLESTEREMIA: ICD-10-CM

## 2019-08-17 DIAGNOSIS — E03.9 ACQUIRED HYPOTHYROIDISM: ICD-10-CM

## 2019-08-17 DIAGNOSIS — Z00.00 ROUTINE GENERAL MEDICAL EXAMINATION AT HEALTH CARE FACILITY: Primary | ICD-10-CM

## 2019-08-19 RX ORDER — ATORVASTATIN CALCIUM 40 MG/1
40 TABLET, FILM COATED ORAL NIGHTLY
Qty: 90 TABLET | Refills: 0 | Status: SHIPPED | OUTPATIENT
Start: 2019-08-19 | End: 2019-11-27

## 2019-08-19 RX ORDER — LEVOTHYROXINE SODIUM 112 UG/1
112 TABLET ORAL
Qty: 90 TABLET | Refills: 0 | Status: SHIPPED | OUTPATIENT
Start: 2019-08-19 | End: 2019-11-27

## 2019-08-19 NOTE — TELEPHONE ENCOUNTER
Received refill request for the following medications :      Sertraline 50 mg- 1 tablet by mouth daily      Atorvastatin 40 mg- 1 tablet by mouth daily     Levothyroxine 122 mcg- 1 tablet by mouth daily     LOV 11-6-18    Last Lipid panel 7-20-18    Refill

## 2019-09-04 ENCOUNTER — OFFICE VISIT (OUTPATIENT)
Dept: NEPHROLOGY | Facility: CLINIC | Age: 59
End: 2019-09-04

## 2019-09-04 ENCOUNTER — TELEPHONE (OUTPATIENT)
Dept: NEPHROLOGY | Facility: CLINIC | Age: 59
End: 2019-09-04

## 2019-09-04 VITALS
WEIGHT: 198 LBS | DIASTOLIC BLOOD PRESSURE: 84 MMHG | HEIGHT: 67 IN | HEART RATE: 91 BPM | RESPIRATION RATE: 20 BRPM | SYSTOLIC BLOOD PRESSURE: 116 MMHG | BODY MASS INDEX: 31.08 KG/M2 | TEMPERATURE: 98 F

## 2019-09-04 DIAGNOSIS — E04.1 THYROID NODULE: ICD-10-CM

## 2019-09-04 DIAGNOSIS — E78.00 HYPERCHOLESTEREMIA: Primary | ICD-10-CM

## 2019-09-04 DIAGNOSIS — E03.9 ACQUIRED HYPOTHYROIDISM: ICD-10-CM

## 2019-09-04 PROCEDURE — 99214 OFFICE O/P EST MOD 30 MIN: CPT | Performed by: INTERNAL MEDICINE

## 2019-09-04 NOTE — TELEPHONE ENCOUNTER
Tell the patient she is also due for a follow-up ultrasound of the thyroid to make sure that thyroid nodules have remained stable. Have her do. I ordered.

## 2019-09-04 NOTE — PROGRESS NOTES
[facility]  Nephrology Daily Progress Note    Giovanny Martinez  [id number]  60year old  Patient presents with: Follow - Up: Patient present for follow up visit      HPI:   Giovanny Martinez is a 60year old female.   59-year-old female with a history of h 9/4/2019   Weight 208 lbs 209 lbs 6 oz 198 lbs       Constitutional: appears well hydrated alert and responsive no acute distress noted  Neck/Thyroid: neck is supple without adenopathy  Lymphatic: no abnormal cervical, supraclavicular or axillary adenopath Hypercholesteremia     Acquired hypothyroidism     Thyroid nodule     Acute chest pain     Seasonal allergic rhinitis      Overall appears to be stable. Again urged to do routine follow-up laboratory studies as previously ordered.   Also do a follow-up ult

## 2019-10-02 ENCOUNTER — HOSPITAL ENCOUNTER (OUTPATIENT)
Dept: ULTRASOUND IMAGING | Age: 59
Discharge: HOME | End: 2019-10-02
Attending: INTERNAL MEDICINE
Payer: COMMERCIAL

## 2019-10-02 DIAGNOSIS — E04.1 THYROID NODULE: ICD-10-CM

## 2019-10-02 PROCEDURE — 76536 US EXAM OF HEAD AND NECK: CPT | Performed by: INTERNAL MEDICINE

## 2019-10-09 ENCOUNTER — TELEPHONE (OUTPATIENT)
Dept: NEPHROLOGY | Facility: CLINIC | Age: 59
End: 2019-10-09

## 2019-10-09 NOTE — TELEPHONE ENCOUNTER
The ultrasound shows that the 2 nodules in the right side of the thyroid gland appeared fairly stable in size and character. Radiology does recommending a repeat ultrasound in 1 year to make sure things remain stable.   Have her do

## 2019-10-18 ENCOUNTER — LAB ENCOUNTER (OUTPATIENT)
Dept: LAB | Age: 59
End: 2019-10-18
Attending: INTERNAL MEDICINE
Payer: COMMERCIAL

## 2019-10-18 DIAGNOSIS — E78.00 HYPERCHOLESTEREMIA: ICD-10-CM

## 2019-10-18 DIAGNOSIS — Z00.00 ROUTINE GENERAL MEDICAL EXAMINATION AT HEALTH CARE FACILITY: ICD-10-CM

## 2019-10-18 DIAGNOSIS — E03.9 ACQUIRED HYPOTHYROIDISM: ICD-10-CM

## 2019-10-18 PROCEDURE — 80061 LIPID PANEL: CPT

## 2019-10-18 PROCEDURE — 85025 COMPLETE CBC W/AUTO DIFF WBC: CPT

## 2019-10-18 PROCEDURE — 84443 ASSAY THYROID STIM HORMONE: CPT

## 2019-10-18 PROCEDURE — 80053 COMPREHEN METABOLIC PANEL: CPT

## 2019-10-18 PROCEDURE — 36415 COLL VENOUS BLD VENIPUNCTURE: CPT

## 2019-10-18 PROCEDURE — 81001 URINALYSIS AUTO W/SCOPE: CPT

## 2019-11-27 RX ORDER — LEVOTHYROXINE SODIUM 112 UG/1
112 TABLET ORAL
Qty: 90 TABLET | Refills: 1 | Status: SHIPPED | OUTPATIENT
Start: 2019-11-27 | End: 2020-05-26

## 2019-11-27 RX ORDER — ATORVASTATIN CALCIUM 40 MG/1
40 TABLET, FILM COATED ORAL NIGHTLY
Qty: 90 TABLET | Refills: 1 | Status: SHIPPED | OUTPATIENT
Start: 2019-11-27 | End: 2020-05-26

## 2019-11-27 NOTE — TELEPHONE ENCOUNTER
LOV 9/4/19. RTC 6 months (3/4/20). No changes to meds made at LOV or in [facility]. Last lipid and TSH 10/18/19. Rxs refilled per [facility] refill protocol in his absence.

## 2020-05-08 ENCOUNTER — TELEPHONE (OUTPATIENT)
Dept: NEPHROLOGY | Facility: CLINIC | Age: 60
End: 2020-05-08

## 2020-05-08 NOTE — TELEPHONE ENCOUNTER
Left message reminding patient about doing Cologuard test. Fax received that test has not been completed. Dr. Sharon Castro wanted staff to remind patient to do this test or why she didn't do it.

## 2020-05-26 DIAGNOSIS — E78.00 HYPERCHOLESTEREMIA: Primary | ICD-10-CM

## 2020-05-26 RX ORDER — ATORVASTATIN CALCIUM 40 MG/1
TABLET, FILM COATED ORAL
Qty: 90 TABLET | Refills: 0 | Status: SHIPPED | OUTPATIENT
Start: 2020-05-26 | End: 2020-09-24

## 2020-05-26 RX ORDER — LEVOTHYROXINE SODIUM 112 UG/1
TABLET ORAL
Qty: 90 TABLET | Refills: 0 | Status: SHIPPED | OUTPATIENT
Start: 2020-05-26 | End: 2020-09-24

## 2020-05-26 NOTE — TELEPHONE ENCOUNTER
Last seen 9/4/19. RTC in 6 mos (3/2020) No f/u scheduled. Last lipid panel was done on 10/18/19. Refill pended and routed to Dr. Willy Miranda.

## 2020-05-26 NOTE — TELEPHONE ENCOUNTER
5/26 Spoke with patient states unable to do  labs before the end of August due to work. Patient states Dr Zion Serna is aware. Labs entered.

## 2020-08-04 ENCOUNTER — APPOINTMENT (OUTPATIENT)
Dept: LAB | Age: 60
End: 2020-08-04
Attending: INTERNAL MEDICINE
Payer: COMMERCIAL

## 2020-08-04 DIAGNOSIS — E78.00 HYPERCHOLESTEREMIA: ICD-10-CM

## 2020-08-04 LAB
ALBUMIN SERPL-MCNC: 3.7 G/DL (ref 3.4–5)
ALBUMIN/GLOB SERPL: 1 {RATIO} (ref 1–2)
ALP LIVER SERPL-CCNC: 95 U/L (ref 46–118)
ALT SERPL-CCNC: 37 U/L (ref 13–56)
ANION GAP SERPL CALC-SCNC: 4 MMOL/L (ref 0–18)
AST SERPL-CCNC: 17 U/L (ref 15–37)
BILIRUB SERPL-MCNC: 0.7 MG/DL (ref 0.1–2)
BUN BLD-MCNC: 12 MG/DL (ref 7–18)
BUN/CREAT SERPL: 14.6 (ref 10–20)
CALCIUM BLD-MCNC: 9.4 MG/DL (ref 8.5–10.1)
CHLORIDE SERPL-SCNC: 111 MMOL/L (ref 98–112)
CHOLEST SMN-MCNC: 151 MG/DL (ref ?–200)
CO2 SERPL-SCNC: 28 MMOL/L (ref 21–32)
CREAT BLD-MCNC: 0.82 MG/DL (ref 0.55–1.02)
GLOBULIN PLAS-MCNC: 3.6 G/DL (ref 2.8–4.4)
GLUCOSE BLD-MCNC: 81 MG/DL (ref 70–99)
HDLC SERPL-MCNC: 56 MG/DL (ref 40–59)
LDLC SERPL CALC-MCNC: 61 MG/DL (ref ?–100)
M PROTEIN MFR SERPL ELPH: 7.3 G/DL (ref 6.4–8.2)
NONHDLC SERPL-MCNC: 95 MG/DL (ref ?–130)
OSMOLALITY SERPL CALC.SUM OF ELEC: 295 MOSM/KG (ref 275–295)
PATIENT FASTING Y/N/NP: YES
PATIENT FASTING Y/N/NP: YES
POTASSIUM SERPL-SCNC: 4.4 MMOL/L (ref 3.5–5.1)
SODIUM SERPL-SCNC: 143 MMOL/L (ref 136–145)
TRIGL SERPL-MCNC: 168 MG/DL (ref 30–149)
VLDLC SERPL CALC-MCNC: 34 MG/DL (ref 0–30)

## 2020-08-04 PROCEDURE — 80053 COMPREHEN METABOLIC PANEL: CPT

## 2020-08-04 PROCEDURE — 80061 LIPID PANEL: CPT

## 2020-08-04 PROCEDURE — 36415 COLL VENOUS BLD VENIPUNCTURE: CPT

## 2020-08-27 ENCOUNTER — PATIENT MESSAGE (OUTPATIENT)
Dept: NEPHROLOGY | Facility: CLINIC | Age: 60
End: 2020-08-27

## 2020-08-27 NOTE — TELEPHONE ENCOUNTER
From: Tootie Martinez  To:  Lamont Pennington MD  Sent: 8/27/2020 11:02 AM CDT  Subject: Other    I am asking for work accommodations to be allowed to work from home during the [place] pandemic due to my pre-existing condition of asthma, anxiety and age; all con

## 2020-09-06 NOTE — TELEPHONE ENCOUNTER
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 What accommodation is she asking for. Can she do her job from home?

## 2020-09-08 NOTE — TELEPHONE ENCOUNTER
Dr. Viry Cha, patient asking to be able to work from home. See [facility] message from today also. 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 She needs work form completed. Form placed on your desk.

## 2020-09-23 ENCOUNTER — PATIENT MESSAGE (OUTPATIENT)
Dept: NEPHROLOGY | Facility: CLINIC | Age: 60
End: 2020-09-23

## 2020-09-23 ENCOUNTER — VIRTUAL PHONE E/M (OUTPATIENT)
Dept: NEPHROLOGY | Facility: CLINIC | Age: 60
End: 2020-09-23

## 2020-09-23 DIAGNOSIS — E03.9 ACQUIRED HYPOTHYROIDISM: ICD-10-CM

## 2020-09-23 DIAGNOSIS — E04.1 THYROID NODULE: ICD-10-CM

## 2020-09-23 DIAGNOSIS — E78.00 HYPERCHOLESTEREMIA: ICD-10-CM

## 2020-09-23 DIAGNOSIS — J45.20 INTERMITTENT ASTHMA, UNSPECIFIED ASTHMA SEVERITY, UNSPECIFIED WHETHER COMPLICATED: ICD-10-CM

## 2020-09-23 DIAGNOSIS — Z00.00 ROUTINE GENERAL MEDICAL EXAMINATION AT HEALTH CARE FACILITY: Primary | ICD-10-CM

## 2020-09-23 PROCEDURE — 99215 OFFICE O/P EST HI 40 MIN: CPT | Performed by: INTERNAL MEDICINE

## 2020-09-23 RX ORDER — ALBUTEROL SULFATE 90 UG/1
2 AEROSOL, METERED RESPIRATORY (INHALATION) EVERY 6 HOURS PRN
Qty: 1 INHALER | Refills: 3 | Status: SHIPPED | OUTPATIENT
Start: 2020-09-23 | End: 2021-11-02

## 2020-09-23 RX ORDER — BUDESONIDE AND FORMOTEROL FUMARATE DIHYDRATE 80; 4.5 UG/1; UG/1
2 AEROSOL RESPIRATORY (INHALATION) 2 TIMES DAILY
Qty: 1 INHALER | Refills: 3 | Status: SHIPPED | OUTPATIENT
Start: 2020-09-23 | End: 2021-11-02

## 2020-09-23 NOTE — PROGRESS NOTES
Virtual Telephone Check-In    Eugene Travis verbally consents to a Virtual/Telephone Check-In visit on 09/23/20. Patient has been referred to the [facility] website at [email or web address] to review the yearly Consent to Treat document.     Patient Adelaida Martinez patient is doing well. Routine follow-up laboratory studies were ordered. Yue Pulido juma may do better with a long-acting bronchodilator. Recommended Symbicort 2 puffs twice daily on a regular basis and use Ventolin as needed as a rescue inhaler.   Call if

## 2020-09-23 NOTE — TELEPHONE ENCOUNTER
From: Anitra Simms  To: Crystal Nicolas MD  Sent: 9/23/2020 9:48 AM CDT  Subject: Visit Follow-up Question    I received a voice message yesterday stating that Dr. Vinita Cui wanted to move my scheduled tele-visit from 4pm today to 2-230 today.     I am OK w

## 2020-09-23 NOTE — PATIENT INSTRUCTIONS
Please do labs as ordered and the ultrasound of the thyroid in October 2020. Let me know if Symbicort is now working out. I would strongly encourage you to do the Cologuard test as ordered. See your gynecologist for follow-up as well.

## 2020-09-24 RX ORDER — ATORVASTATIN CALCIUM 40 MG/1
40 TABLET, FILM COATED ORAL NIGHTLY
Qty: 90 TABLET | Refills: 1 | Status: SHIPPED | OUTPATIENT
Start: 2020-09-24 | End: 2021-05-09

## 2020-09-24 RX ORDER — LEVOTHYROXINE SODIUM 112 UG/1
112 TABLET ORAL
Qty: 90 TABLET | Refills: 1 | Status: SHIPPED | OUTPATIENT
Start: 2020-09-24 | End: 2021-05-09

## 2020-09-24 NOTE — TELEPHONE ENCOUNTER
Levothyroxine sodium 112 mcg, atorvastatin 40 mg, and sertraline HCL 50 mg rx request. Please review and sign off if appropriate. Thank you.     Last office visit: 9/24/2020 virtual visit  Return to clinic: March 2021  Last Refill:5/26/2020  Last Lipid: 8/4/2020

## 2020-10-13 ENCOUNTER — LAB ENCOUNTER (OUTPATIENT)
Dept: LAB | Age: 60
End: 2020-10-13
Attending: FAMILY MEDICINE
Payer: COMMERCIAL

## 2020-10-13 ENCOUNTER — OFFICE VISIT (OUTPATIENT)
Dept: FAMILY MEDICINE CLINIC | Facility: CLINIC | Age: 60
End: 2020-10-13

## 2020-10-13 VITALS
DIASTOLIC BLOOD PRESSURE: 68 MMHG | BODY MASS INDEX: 32 KG/M2 | TEMPERATURE: 97 F | WEIGHT: 203 LBS | SYSTOLIC BLOOD PRESSURE: 115 MMHG | HEART RATE: 98 BPM

## 2020-10-13 DIAGNOSIS — E03.9 ACQUIRED HYPOTHYROIDISM: ICD-10-CM

## 2020-10-13 DIAGNOSIS — E66.09 CLASS 1 OBESITY DUE TO EXCESS CALORIES WITHOUT SERIOUS COMORBIDITY WITH BODY MASS INDEX (BMI) OF 30.0 TO 30.9 IN ADULT: ICD-10-CM

## 2020-10-13 DIAGNOSIS — E78.00 HYPERCHOLESTEREMIA: ICD-10-CM

## 2020-10-13 DIAGNOSIS — Z12.31 VISIT FOR SCREENING MAMMOGRAM: ICD-10-CM

## 2020-10-13 DIAGNOSIS — E78.00 HYPERCHOLESTEREMIA: Primary | ICD-10-CM

## 2020-10-13 DIAGNOSIS — E04.1 THYROID NODULE: ICD-10-CM

## 2020-10-13 DIAGNOSIS — J45.40 MODERATE PERSISTENT ASTHMA WITHOUT COMPLICATION: ICD-10-CM

## 2020-10-13 PROBLEM — Z00.00 ROUTINE GENERAL MEDICAL EXAMINATION AT HEALTH CARE FACILITY: Status: RESOLVED | Noted: 2017-01-25 | Resolved: 2020-10-13

## 2020-10-13 PROBLEM — R07.9 ACUTE CHEST PAIN: Status: RESOLVED | Noted: 2017-05-28 | Resolved: 2020-10-13

## 2020-10-13 PROCEDURE — 3074F SYST BP LT 130 MM HG: CPT | Performed by: FAMILY MEDICINE

## 2020-10-13 PROCEDURE — 3078F DIAST BP <80 MM HG: CPT | Performed by: FAMILY MEDICINE

## 2020-10-13 PROCEDURE — 36415 COLL VENOUS BLD VENIPUNCTURE: CPT

## 2020-10-13 PROCEDURE — 85025 COMPLETE CBC W/AUTO DIFF WBC: CPT

## 2020-10-13 PROCEDURE — 84443 ASSAY THYROID STIM HORMONE: CPT

## 2020-10-13 PROCEDURE — 99203 OFFICE O/P NEW LOW 30 MIN: CPT | Performed by: FAMILY MEDICINE

## 2020-10-13 PROCEDURE — 84439 ASSAY OF FREE THYROXINE: CPT

## 2020-10-13 RX ORDER — MONTELUKAST SODIUM 10 MG/1
10 TABLET ORAL DAILY
Qty: 90 TABLET | Refills: 3 | Status: SHIPPED | OUTPATIENT
Start: 2020-10-13 | End: 2021-05-09

## 2020-10-13 NOTE — PROGRESS NOTES
Taryn Gonzalez is a 60year old female. Patient presents with:  Establish Care: mammogram order    HPI:   Pt new to me. Reports Symbicort was recently added last month due to increased asthma symptoms. Has been better since using it.  Using albuterol 2 ti feels well otherwise  SKIN: denies any unusual skin lesions or rashes  HEENT: denies eye complaints,denies sore throat, denies ear pain  RESPIRATORY: pos shortness of breath, denies cough  CARDIOVASCULAR: denies chest pain  GI: denies abdominal pain and de

## 2020-11-10 ENCOUNTER — HOSPITAL ENCOUNTER (OUTPATIENT)
Dept: ULTRASOUND IMAGING | Age: 60
Discharge: HOME | End: 2020-11-10
Attending: FAMILY MEDICINE
Payer: COMMERCIAL

## 2020-11-10 DIAGNOSIS — E04.1 THYROID NODULE: ICD-10-CM

## 2020-11-10 PROCEDURE — 76536 US EXAM OF HEAD AND NECK: CPT | Performed by: FAMILY MEDICINE

## 2020-11-12 NOTE — PROGRESS NOTES
Lindsey -The nodule in your thyroid is stable. Can plan to repeat imaging next year.  - Dr Jaqui Genao

## 2020-11-17 ENCOUNTER — OFFICE VISIT (OUTPATIENT)
Dept: FAMILY MEDICINE CLINIC | Facility: CLINIC | Age: 60
End: 2020-11-17

## 2020-11-17 VITALS
SYSTOLIC BLOOD PRESSURE: 117 MMHG | HEIGHT: 67 IN | WEIGHT: 201 LBS | DIASTOLIC BLOOD PRESSURE: 74 MMHG | HEART RATE: 108 BPM | TEMPERATURE: 97 F | BODY MASS INDEX: 31.55 KG/M2

## 2020-11-17 DIAGNOSIS — E66.09 CLASS 1 OBESITY DUE TO EXCESS CALORIES WITHOUT SERIOUS COMORBIDITY WITH BODY MASS INDEX (BMI) OF 30.0 TO 30.9 IN ADULT: Primary | ICD-10-CM

## 2020-11-17 DIAGNOSIS — E03.9 ACQUIRED HYPOTHYROIDISM: ICD-10-CM

## 2020-11-17 PROCEDURE — 3078F DIAST BP <80 MM HG: CPT | Performed by: FAMILY MEDICINE

## 2020-11-17 PROCEDURE — 3008F BODY MASS INDEX DOCD: CPT | Performed by: FAMILY MEDICINE

## 2020-11-17 PROCEDURE — 3074F SYST BP LT 130 MM HG: CPT | Performed by: FAMILY MEDICINE

## 2020-11-17 PROCEDURE — 99213 OFFICE O/P EST LOW 20 MIN: CPT | Performed by: FAMILY MEDICINE

## 2020-11-17 NOTE — PROGRESS NOTES
Kira Mata is a 63year old female. Patient presents with:  Medication Follow-Up    HPI:   Here for follow up. Reports heart rate is elevated first few hours and off of diet pepsi. Feels good/great. Reports her appetite is definitely decreased.  No weig X 2   • Primary hypothyroidism     Radioactive iodine therapy   • Status post biopsy of thyroid gland     Benign thyroid needle bx 2012   • Thyroid disease 2000    medication      Social History:  Social History    Tobacco Use      Smoking status: Never Sm

## 2021-03-10 ENCOUNTER — TELEPHONE (OUTPATIENT)
Dept: NEPHROLOGY | Facility: CLINIC | Age: 61
End: 2021-03-10

## 2021-03-10 NOTE — TELEPHONE ENCOUNTER
Dr. Megan Murray received an  order from [facility] for Cologuard test. Patient did not complete test. Per Dr. Xiomara Mustafa patient to do the test. Will need a new order sent if patient agrees.

## 2021-03-10 NOTE — TELEPHONE ENCOUNTER
Patient contacted and she agrees to do the test. New order faxed to [employer]. She will need a new kit from them mailed to her home.

## 2021-03-10 NOTE — TELEPHONE ENCOUNTER
1st attempt - "[employer]t message sent to patient to contact the office to schedule appointment.

## 2021-03-16 ENCOUNTER — HOSPITAL ENCOUNTER (OUTPATIENT)
Dept: MAMMOGRAPHY | Age: 61
Discharge: HOME | End: 2021-03-16
Attending: FAMILY MEDICINE
Payer: COMMERCIAL

## 2021-03-16 DIAGNOSIS — Z12.31 VISIT FOR SCREENING MAMMOGRAM: ICD-10-CM

## 2021-03-16 PROCEDURE — 77063 BREAST TOMOSYNTHESIS BI: CPT | Performed by: FAMILY MEDICINE

## 2021-03-16 PROCEDURE — 77067 SCR MAMMO BI INCL CAD: CPT | Performed by: FAMILY MEDICINE

## 2021-03-24 ENCOUNTER — TELEPHONE (OUTPATIENT)
Dept: NEPHROLOGY | Facility: CLINIC | Age: 61
End: 2021-03-24

## 2021-03-30 ENCOUNTER — HOSPITAL ENCOUNTER (OUTPATIENT)
Dept: GENERAL RADIOLOGY | Age: 61
Discharge: HOME | End: 2021-03-30
Attending: FAMILY MEDICINE
Payer: COMMERCIAL

## 2021-03-30 ENCOUNTER — OFFICE VISIT (OUTPATIENT)
Dept: FAMILY MEDICINE CLINIC | Facility: CLINIC | Age: 61
End: 2021-03-30

## 2021-03-30 VITALS
HEIGHT: 67 IN | TEMPERATURE: 97 F | SYSTOLIC BLOOD PRESSURE: 118 MMHG | WEIGHT: 191.38 LBS | HEART RATE: 101 BPM | BODY MASS INDEX: 30.04 KG/M2 | DIASTOLIC BLOOD PRESSURE: 79 MMHG

## 2021-03-30 DIAGNOSIS — M25.561 CHRONIC PAIN OF BOTH KNEES: ICD-10-CM

## 2021-03-30 DIAGNOSIS — E03.9 ACQUIRED HYPOTHYROIDISM: Primary | ICD-10-CM

## 2021-03-30 DIAGNOSIS — M25.562 CHRONIC PAIN OF BOTH KNEES: ICD-10-CM

## 2021-03-30 DIAGNOSIS — G89.29 CHRONIC PAIN OF BOTH KNEES: ICD-10-CM

## 2021-03-30 DIAGNOSIS — E78.00 HYPERCHOLESTEREMIA: ICD-10-CM

## 2021-03-30 DIAGNOSIS — E66.3 OVERWEIGHT (BMI 25.0-29.9): ICD-10-CM

## 2021-03-30 PROCEDURE — 3074F SYST BP LT 130 MM HG: CPT | Performed by: FAMILY MEDICINE

## 2021-03-30 PROCEDURE — 73562 X-RAY EXAM OF KNEE 3: CPT | Performed by: FAMILY MEDICINE

## 2021-03-30 PROCEDURE — 3008F BODY MASS INDEX DOCD: CPT | Performed by: FAMILY MEDICINE

## 2021-03-30 PROCEDURE — 99214 OFFICE O/P EST MOD 30 MIN: CPT | Performed by: FAMILY MEDICINE

## 2021-03-30 PROCEDURE — 3078F DIAST BP <80 MM HG: CPT | Performed by: FAMILY MEDICINE

## 2021-03-30 NOTE — PROGRESS NOTES
Mague Jeffries is a 63year old female. Patient presents with:  Medication Follow-Up: vyvanse  Referral: ortho    HPI:   Pt here for follow up. Doing well with vyvanse. Has helped her lose weight. No side effects. Has more energy and exercising more.    Sti tobacco: Never Used    Alcohol use: Yes      Comment: Occasionally (rarely per [facility] PCA)    Drug use: No       REVIEW OF SYSTEMS:   GENERAL HEALTH: feels well otherwise  SKIN: denies any unusual skin lesions or rashes  HEENT: denies eye complaints,denies sor

## 2021-03-30 NOTE — PROGRESS NOTES
Micheal Portillo - X-ray confirms significant arthritis more in the inner knee - medial aspect.  See ortho as discussed. - Dr. Nayla Toure

## 2021-04-13 ENCOUNTER — OFFICE VISIT (OUTPATIENT)
Dept: ORTHOPEDICS CLINIC | Facility: CLINIC | Age: 61
End: 2021-04-13

## 2021-04-13 VITALS
SYSTOLIC BLOOD PRESSURE: 113 MMHG | BODY MASS INDEX: 28.64 KG/M2 | DIASTOLIC BLOOD PRESSURE: 72 MMHG | HEIGHT: 68 IN | HEART RATE: 76 BPM | WEIGHT: 189 LBS

## 2021-04-13 DIAGNOSIS — M17.0 BILATERAL PRIMARY OSTEOARTHRITIS OF KNEE: Primary | ICD-10-CM

## 2021-04-13 PROCEDURE — 3078F DIAST BP <80 MM HG: CPT | Performed by: ORTHOPAEDIC SURGERY

## 2021-04-13 PROCEDURE — 3074F SYST BP LT 130 MM HG: CPT | Performed by: ORTHOPAEDIC SURGERY

## 2021-04-13 PROCEDURE — 20610 DRAIN/INJ JOINT/BURSA W/O US: CPT | Performed by: ORTHOPAEDIC SURGERY

## 2021-04-13 PROCEDURE — 99243 OFF/OP CNSLTJ NEW/EST LOW 30: CPT | Performed by: ORTHOPAEDIC SURGERY

## 2021-04-13 PROCEDURE — 3008F BODY MASS INDEX DOCD: CPT | Performed by: ORTHOPAEDIC SURGERY

## 2021-04-13 RX ORDER — IBUPROFEN 600 MG/1
200 TABLET ORAL EVERY 6 HOURS PRN
COMMUNITY

## 2021-04-13 RX ORDER — TRIAMCINOLONE ACETONIDE 40 MG/ML
40 INJECTION, SUSPENSION INTRA-ARTICULAR; INTRAMUSCULAR ONCE
Status: COMPLETED | OUTPATIENT
Start: 2021-04-13 | End: 2021-04-13

## 2021-04-13 NOTE — H&P
NURSING INTAKE COMMENTS: Patient presents with:  Knee Pain: Consult for Arthritis in both knees. R worse than the left.        HPI: This 63year old female presents today with complaints of greater than left knee pain on a daily basis that is progressively w Inhalation Aero Soln Inhale 2 puffs into the lungs every 6 (six) hours as needed for Wheezing. 1 Inhaler 3   • Budesonide-Formoterol Fumarate 80-4.5 MCG/ACT Inhalation Aerosol Inhale 2 puffs into the lungs 2 (two) times daily.  1 Inhaler 3   • Multiple Theresa Wt 189 lb (85.7 kg)   LMP  (LMP Unknown)   BMI 28.74 kg/m²   General appearance: alert and oriented, not in acute distress  Head and neck: Atraumatic, normocephalic, PERRLA  Respiratory: Regular, unlabored breathing  Lymphatics: no LAD  Vascular: 2+ and sy of the bony structures. There is considerable narrowing of the medial joint space compartment. There is some cystic change about the articular surface of the patella with some patellofemoral arthritis. SOFT TISSUES: Negative.   No visible soft tissue swel lump [place] = Skin tag or mole BB = Nipple Linear jamison = [place] = Pain    Dictated by (CST): Graciela Prather MD on 3/16/2021 at 10:21 AM     Finalized by (CST): Graciela Prather MD on 3/16/2021 at 10:27 AM             Bilateral knee x-rays were reviewed Aurelio Arboleda MD

## 2021-04-13 NOTE — PROCEDURES
The patient identified the left and right knee as the correct procedure site. This was verified with the consent and with 2 patient identifiers. The superolateral patellar injection site was prepped with betadine and alcohol.   A 22-gauge needle was introd

## 2021-05-07 ENCOUNTER — PATIENT MESSAGE (OUTPATIENT)
Dept: FAMILY MEDICINE CLINIC | Facility: CLINIC | Age: 61
End: 2021-05-07

## 2021-05-07 NOTE — TELEPHONE ENCOUNTER
From: Xena Bell  To: Chares Kehr, MD  Sent: 5/7/2021 3:06 PM CDT  Subject: Prescription Question    Please send refills of the following RX - some were originally prescribed by Dr. Kristy Fontenot, and need to be renewed under Dr. Eldon Best name and NPI as s

## 2021-05-09 RX ORDER — LEVOTHYROXINE SODIUM 112 UG/1
112 TABLET ORAL
Qty: 90 TABLET | Refills: 1 | Status: SHIPPED | OUTPATIENT
Start: 2021-05-09 | End: 2021-11-02

## 2021-05-09 RX ORDER — MONTELUKAST SODIUM 10 MG/1
10 TABLET ORAL DAILY
Qty: 90 TABLET | Refills: 3 | Status: SHIPPED | OUTPATIENT
Start: 2021-05-09 | End: 2021-11-02

## 2021-05-09 RX ORDER — ATORVASTATIN CALCIUM 40 MG/1
40 TABLET, FILM COATED ORAL NIGHTLY
Qty: 90 TABLET | Refills: 1 | Status: SHIPPED | OUTPATIENT
Start: 2021-05-09 | End: 2021-11-02

## 2021-06-15 ENCOUNTER — OFFICE VISIT (OUTPATIENT)
Dept: ORTHOPEDICS CLINIC | Facility: CLINIC | Age: 61
End: 2021-06-15

## 2021-06-15 VITALS — WEIGHT: 189 LBS | HEIGHT: 68 IN | BODY MASS INDEX: 28.64 KG/M2

## 2021-06-15 DIAGNOSIS — M17.0 PRIMARY OSTEOARTHRITIS OF BOTH KNEES: Primary | ICD-10-CM

## 2021-06-15 PROCEDURE — 99213 OFFICE O/P EST LOW 20 MIN: CPT | Performed by: ORTHOPAEDIC SURGERY

## 2021-06-15 PROCEDURE — 3008F BODY MASS INDEX DOCD: CPT | Performed by: ORTHOPAEDIC SURGERY

## 2021-06-15 NOTE — PROGRESS NOTES
NURSING INTAKE COMMENTS: Patient presents with:  Knee Pain: 2 month f/u visit regarding bilateral knees. had cortisone injections at [address] [place] [address]. states both knees are  but Left knee is more tender when walking compared to right knee.  states injections mouth daily. 90 tablet 1   • Levothyroxine Sodium 112 MCG Oral Tab Take 1 tablet (112 mcg total) by mouth every morning before breakfast. 90 tablet 1   • Montelukast Sodium (SINGULAIR) 10 MG Oral Tab Take 1 tablet (10 mg total) by mouth daily.  90 tablet 3 pulses  Skin: intact and benign  Neurologic: Bilateral sensation intact to light touch and motor strength intact grossly  Musculoskeletal: Bilateral knee exam demonstrates no warmth, erythema or edema. Mild effusions.   Knee range of motion 0 to 130 degree

## 2021-06-17 ENCOUNTER — TELEPHONE (OUTPATIENT)
Dept: ORTHOPEDICS CLINIC | Facility: CLINIC | Age: 61
End: 2021-06-17

## 2021-06-17 RX ORDER — LISDEXAMFETAMINE DIMESYLATE CAPSULES 40 MG/1
40 CAPSULE ORAL EVERY MORNING
Qty: 30 CAPSULE | Refills: 0 | Status: SHIPPED | OUTPATIENT
Start: 2021-06-17 | End: 2021-07-21

## 2021-06-17 NOTE — TELEPHONE ENCOUNTER
See [employer] message from today for further documentation.  This is a duplicate request.

## 2021-06-17 NOTE — TELEPHONE ENCOUNTER
Per pt calling to follow up on injection authorization. Per pt if she cannot answer please leave a detailed message.  Please advise

## 2021-06-18 ENCOUNTER — OFFICE VISIT (OUTPATIENT)
Dept: ORTHOPEDICS CLINIC | Facility: CLINIC | Age: 61
End: 2021-06-18

## 2021-06-18 VITALS — SYSTOLIC BLOOD PRESSURE: 116 MMHG | HEART RATE: 91 BPM | DIASTOLIC BLOOD PRESSURE: 79 MMHG

## 2021-06-18 DIAGNOSIS — M17.0 BILATERAL PRIMARY OSTEOARTHRITIS OF KNEE: Primary | ICD-10-CM

## 2021-06-18 PROCEDURE — 3074F SYST BP LT 130 MM HG: CPT | Performed by: ORTHOPAEDIC SURGERY

## 2021-06-18 PROCEDURE — 20610 DRAIN/INJ JOINT/BURSA W/O US: CPT | Performed by: ORTHOPAEDIC SURGERY

## 2021-06-18 PROCEDURE — 3078F DIAST BP <80 MM HG: CPT | Performed by: ORTHOPAEDIC SURGERY

## 2021-06-18 NOTE — PROGRESS NOTES
NURSING INTAKE COMMENTS: Patient presents with:  Knee Pain: synvisc injection of left knee. rates pain 8/10. HPI: This 63year old female presents today with complaints of continued bilateral knee pain worse in the left knee.   Patient did not have rel Inhalation Aerosol Inhale 2 puffs into the lungs 2 (two) times daily. 1 Inhaler 3   • Multiple Vitamin (MULTIVITAMINS OR) Take 500 tablets by mouth.          Penicillin G Procai*    ANAPHYLAXIS  Tetanus-Diphtheria *    PALPITATIONS  Family History   Problem this visit:    Bilateral primary osteoarthritis of knee  -     DRAIN/INJECT LARGE JOINT/BURSA  -     hyaluronic acid (SYNVISC-ONE) injection        Assessment: Left knee pain consistent with osteoarthritis    Plan: Synvisc 1 injection today.   Follow-up for

## 2021-06-18 NOTE — PROCEDURES
The patient identified the left knee as the correct procedure site. This was verified with the consent and with 2 patient identifiers. The lateral patellar injection site was prepped with chlorhexidine. A 18-gauge needle was introduced into the knee.   Hugo Nickerson

## 2021-06-22 ENCOUNTER — OFFICE VISIT (OUTPATIENT)
Dept: ORTHOPEDICS CLINIC | Facility: CLINIC | Age: 61
End: 2021-06-22

## 2021-06-22 VITALS
HEIGHT: 68 IN | WEIGHT: 184 LBS | BODY MASS INDEX: 27.89 KG/M2 | SYSTOLIC BLOOD PRESSURE: 110 MMHG | HEART RATE: 88 BPM | DIASTOLIC BLOOD PRESSURE: 78 MMHG

## 2021-06-22 DIAGNOSIS — M17.0 BILATERAL PRIMARY OSTEOARTHRITIS OF KNEE: Primary | ICD-10-CM

## 2021-06-22 PROCEDURE — 3078F DIAST BP <80 MM HG: CPT | Performed by: ORTHOPAEDIC SURGERY

## 2021-06-22 PROCEDURE — 3074F SYST BP LT 130 MM HG: CPT | Performed by: ORTHOPAEDIC SURGERY

## 2021-06-22 PROCEDURE — 3008F BODY MASS INDEX DOCD: CPT | Performed by: ORTHOPAEDIC SURGERY

## 2021-06-22 PROCEDURE — 20610 DRAIN/INJ JOINT/BURSA W/O US: CPT | Performed by: ORTHOPAEDIC SURGERY

## 2021-06-22 NOTE — PROCEDURES
The patient identified the right knee as the correct procedure site. This was verified with the consent and with 2 patient identifiers. The lateral patellar injection site was prepped with chlorhexidine. A 18-gauge needle was introduced into the knee.   Samella Marrow

## 2021-06-22 NOTE — PROGRESS NOTES
NURSING INTAKE COMMENTS: Patient presents with:  Immunization/Injection: Right knee Synvisc injection      HPI: This 63year old female presents today with complaints of right knee Synvisc injection.   Patient had a left knee Synvisc injection last week he needed for Wheezing. 1 Inhaler 3   • Budesonide-Formoterol Fumarate 80-4.5 MCG/ACT Inhalation Aerosol Inhale 2 puffs into the lungs 2 (two) times daily. 1 Inhaler 3   • Multiple Vitamin (MULTIVITAMINS OR) Take 500 tablets by mouth.          Penicillin G Pro Lab Results   Component Value Date    GLU 81 08/04/2020    BUN 12 08/04/2020    CREATSERUM 0.82 08/04/2020    GFRNAA 78 08/04/2020    GFRAA 90 08/04/2020        Assessment and Plan:  Diagnoses and all orders for this visit:    Bilateral primary osteoarth

## 2021-07-21 ENCOUNTER — PATIENT MESSAGE (OUTPATIENT)
Dept: FAMILY MEDICINE CLINIC | Facility: CLINIC | Age: 61
End: 2021-07-21

## 2021-07-21 NOTE — TELEPHONE ENCOUNTER
From: Walter Travis  To: Meliza Pierce MD  Sent: 7/21/2021 7:59 AM CDT  Subject: Referral Request    Please renew monthly refill of Vyvanse 40mg for 30 days. Please send to [facility] on record.      Thank you,    Maddison Mon

## 2021-07-22 ENCOUNTER — TELEPHONE (OUTPATIENT)
Dept: FAMILY MEDICINE CLINIC | Facility: CLINIC | Age: 61
End: 2021-07-22

## 2021-07-22 NOTE — TELEPHONE ENCOUNTER
Per pharmacy PA is needed for the following medication.     •  Lisdexamfetamine Dimesylate (VYVANSE) 40 MG Oral Cap, Take 1 capsule (40 mg total) by mouth every morning., Disp: 30 capsule, Rfl: 0    Key: [id number]  Patient last name: Thaddeus  : 10/17/1960

## 2021-07-23 NOTE — TELEPHONE ENCOUNTER
Prior authorization for vyvanse has been initiated through [employer] using keycode: [id number] It takes about 1-5 business days for a decision to come back.

## 2021-08-19 RX ORDER — LISDEXAMFETAMINE DIMESYLATE CAPSULES 40 MG/1
40 CAPSULE ORAL EVERY MORNING
Qty: 30 CAPSULE | Refills: 0 | Status: SHIPPED | OUTPATIENT
Start: 2021-08-19 | End: 2021-09-22

## 2021-10-25 NOTE — TELEPHONE ENCOUNTER
Please review. Protocol Failed / No Protocol. Requested Prescriptions   Pending Prescriptions Disp Refills    Lisdexamfetamine Dimesylate (VYVANSE) 40 MG Oral Cap 30 capsule 0     Sig: Take 1 capsule (40 mg total) by mouth every morning.  Early refill o

## 2021-10-26 ENCOUNTER — LAB ENCOUNTER (OUTPATIENT)
Dept: LAB | Age: 61
End: 2021-10-26
Attending: FAMILY MEDICINE
Payer: COMMERCIAL

## 2021-10-26 PROCEDURE — 36415 COLL VENOUS BLD VENIPUNCTURE: CPT | Performed by: FAMILY MEDICINE

## 2021-10-26 PROCEDURE — 82306 VITAMIN D 25 HYDROXY: CPT | Performed by: FAMILY MEDICINE

## 2021-10-26 PROCEDURE — 80053 COMPREHEN METABOLIC PANEL: CPT | Performed by: FAMILY MEDICINE

## 2021-10-26 PROCEDURE — 85025 COMPLETE CBC W/AUTO DIFF WBC: CPT | Performed by: FAMILY MEDICINE

## 2021-10-26 PROCEDURE — 80061 LIPID PANEL: CPT | Performed by: FAMILY MEDICINE

## 2021-10-26 PROCEDURE — 84439 ASSAY OF FREE THYROXINE: CPT | Performed by: FAMILY MEDICINE

## 2021-10-26 PROCEDURE — 84443 ASSAY THYROID STIM HORMONE: CPT | Performed by: FAMILY MEDICINE

## 2021-11-01 ENCOUNTER — PATIENT MESSAGE (OUTPATIENT)
Dept: FAMILY MEDICINE CLINIC | Facility: CLINIC | Age: 61
End: 2021-11-01

## 2021-11-01 NOTE — TELEPHONE ENCOUNTER
From: Lesvia Guadarrama  To: Sana Haji MD  Sent: 11/1/2021 1:28 PM CDT  Subject: Flu Vaccine     I have an office visit scheduled for tomorrow 11/2 at 21 [phone]. I would like to receive my flu vaccine at this appointment.  Thanks

## 2021-11-02 ENCOUNTER — TELEPHONE (OUTPATIENT)
Dept: FAMILY MEDICINE CLINIC | Facility: CLINIC | Age: 61
End: 2021-11-02

## 2021-11-02 ENCOUNTER — OFFICE VISIT (OUTPATIENT)
Dept: FAMILY MEDICINE CLINIC | Facility: CLINIC | Age: 61
End: 2021-11-02

## 2021-11-02 VITALS
SYSTOLIC BLOOD PRESSURE: 103 MMHG | DIASTOLIC BLOOD PRESSURE: 59 MMHG | TEMPERATURE: 97 F | WEIGHT: 178 LBS | HEART RATE: 109 BPM | BODY MASS INDEX: 26.98 KG/M2 | HEIGHT: 68 IN

## 2021-11-02 DIAGNOSIS — E55.9 VITAMIN D DEFICIENCY: ICD-10-CM

## 2021-11-02 DIAGNOSIS — E66.3 OVERWEIGHT (BMI 25.0-29.9): ICD-10-CM

## 2021-11-02 DIAGNOSIS — E03.9 ACQUIRED HYPOTHYROIDISM: ICD-10-CM

## 2021-11-02 DIAGNOSIS — Z00.00 ANNUAL PHYSICAL EXAM: Primary | ICD-10-CM

## 2021-11-02 DIAGNOSIS — E78.00 HYPERCHOLESTEREMIA: ICD-10-CM

## 2021-11-02 PROCEDURE — 3078F DIAST BP <80 MM HG: CPT | Performed by: FAMILY MEDICINE

## 2021-11-02 PROCEDURE — 3074F SYST BP LT 130 MM HG: CPT | Performed by: FAMILY MEDICINE

## 2021-11-02 PROCEDURE — 3008F BODY MASS INDEX DOCD: CPT | Performed by: FAMILY MEDICINE

## 2021-11-02 PROCEDURE — 90471 IMMUNIZATION ADMIN: CPT | Performed by: FAMILY MEDICINE

## 2021-11-02 PROCEDURE — 99396 PREV VISIT EST AGE 40-64: CPT | Performed by: FAMILY MEDICINE

## 2021-11-02 PROCEDURE — 90686 IIV4 VACC NO PRSV 0.5 ML IM: CPT | Performed by: FAMILY MEDICINE

## 2021-11-02 RX ORDER — BUDESONIDE AND FORMOTEROL FUMARATE DIHYDRATE 80; 4.5 UG/1; UG/1
2 AEROSOL RESPIRATORY (INHALATION) 2 TIMES DAILY
Qty: 10 G | Refills: 2 | Status: SHIPPED | OUTPATIENT
Start: 2021-11-02

## 2021-11-02 RX ORDER — MONTELUKAST SODIUM 10 MG/1
10 TABLET ORAL DAILY
Qty: 90 TABLET | Refills: 3 | Status: SHIPPED | OUTPATIENT
Start: 2021-11-02 | End: 2022-10-28

## 2021-11-02 RX ORDER — LEVOTHYROXINE SODIUM 112 UG/1
112 TABLET ORAL
Qty: 90 TABLET | Refills: 3 | Status: SHIPPED | OUTPATIENT
Start: 2021-11-02

## 2021-11-02 RX ORDER — ATORVASTATIN CALCIUM 40 MG/1
40 TABLET, FILM COATED ORAL NIGHTLY
Qty: 90 TABLET | Refills: 3 | Status: SHIPPED | OUTPATIENT
Start: 2021-11-02

## 2021-11-02 RX ORDER — MULTIVIT-MIN/IRON/FOLIC ACID/K 18-600-40
CAPSULE ORAL
COMMUNITY

## 2021-11-02 RX ORDER — ALBUTEROL SULFATE 90 UG/1
2 AEROSOL, METERED RESPIRATORY (INHALATION) EVERY 6 HOURS PRN
Qty: 18 G | Refills: 2 | Status: SHIPPED | OUTPATIENT
Start: 2021-11-02

## 2021-11-02 NOTE — TELEPHONE ENCOUNTER
Budesonide-Formoterol Fumarate 80-4.5 MCG/ACT Inhalation Aerosol, Inhale 2 puffs into the lungs 2 (two) times daily. , Disp: 10 g, Rfl: 2    Key: [id number]  Patient Last Name: Ivet Roger   : 10/17/1960

## 2021-11-02 NOTE — PROGRESS NOTES
HPI:   Lu Amador is a 57year old female who presents for a complete physical exam.    Pt here for regular physical. Staying active - exercising and watching her diet. Will start vitamin D supplements.      Wt Readings from Last 3 Encounters:  11/02/21 medication      Past Surgical History:   Procedure Laterality Date   •   , 1989    X 2   • COLONOSCOPY     • ELECTROCARDIOGRAM, COMPLETE  2012    Scanned to media tab 2012   • TONSILLECTOMY     • WRIST FRACTURE SURGERY Left lesions  HEENT: atraumatic, normocephalic,ears and throat are clear  EYES:PERRLA, EOMI,conjunctiva are clear  NECK: supple,no adenopathy,no bruits  CHEST: no chest tenderness  LUNGS: clear to auscultation  CARDIO: RRR without murmur  GI: good BS's,no lloyd

## 2021-11-03 NOTE — TELEPHONE ENCOUNTER
Prior authorization was done through [employer] scripts    Fax Submitted Successfully to [phone]

## 2021-11-09 NOTE — TELEPHONE ENCOUNTER
Spoke to Sydney from [employer] and on 11/5/21 it was closed because its covered for $20 for 30 day supply and $ 40 for a 90 day.      Spoke to Fifi from [employer] and they will process the medication

## 2022-02-04 NOTE — TELEPHONE ENCOUNTER
Please review; protocol failed/No Protcol    Requested Prescriptions   Pending Prescriptions Disp Refills    Lisdexamfetamine Dimesylate (VYVANSE) 40 MG Oral Cap 30 capsule 0     Sig: Take 1 capsule (40 mg total) by mouth every morning.  Early refill ok        There is no refill protocol information for this order           Recent Outpatient Visits              3 months ago Annual physical exam    [address] Scott MD    Office Visit    7 months ago Bilateral primary osteoarthritis of knee    [facility] for Antoinette Velazquez MD    Office Visit    7 months ago Bilateral primary osteoarthritis of knee    [facility] for Aundra Shoe, MD    Office Visit    7 months ago Primary osteoarthritis of both knees    [facility] for Aundra Shoe, MD    Office Visit    9 months ago Bilateral primary osteoarthritis of knee    [facility] for Aundra Shoe, MD    Office Visit

## 2022-02-11 NOTE — TELEPHONE ENCOUNTER
Please review. Protocol failed/ No protocol. Last RX given 1/10/22  Requested Prescriptions   Pending Prescriptions Disp Refills    Lisdexamfetamine Dimesylate (VYVANSE) 40 MG Oral Cap 30 capsule 0     Sig: Take 1 capsule (40 mg total) by mouth every morning.  Early refill ok        There is no refill protocol information for this order           Recent Outpatient Visits              3 months ago Annual physical exam    [address] MD    Office Visit    7 months ago Bilateral primary osteoarthritis of knee    [facility] for Milderd MD Deena    Office Visit    7 months ago Bilateral primary osteoarthritis of knee    [facility] for Baldo Shaffer MD    Office Visit    8 months ago Primary osteoarthritis of both knees    [facility] for Baldo Shaffer MD    Office Visit    10 months ago Bilateral primary osteoarthritis of knee    [facility] for Baldo Shaffer MD    Office Visit

## 2022-03-01 RX ORDER — LISDEXAMFETAMINE DIMESYLATE CAPSULES 40 MG/1
40 CAPSULE ORAL EVERY MORNING
Qty: 30 CAPSULE | Refills: 0 | OUTPATIENT
Start: 2022-03-01

## 2022-03-13 NOTE — TELEPHONE ENCOUNTER
Please review. Protocol failed / No protocol. Received as 1 month fill, pended as 90 day panel for provider consideration. Requested Prescriptions   Pending Prescriptions Disp Refills    Lisdexamfetamine Dimesylate (VYVANSE) 40 MG Oral Cap 30 capsule 0     Sig: Take 1 capsule (40 mg total) by mouth every morning.         There is no refill protocol information for this order          Recent Outpatient Visits              4 months ago Annual physical exam    [address] MD    Office Visit    8 months ago Bilateral primary osteoarthritis of knee    [facility] for Marques Al MD    Office Visit    8 months ago Bilateral primary osteoarthritis of knee    [facility] for Marques Al MD    Office Visit    9 months ago Primary osteoarthritis of both knees    [facility] for Marques Al MD    Office Visit    11 months ago Bilateral primary osteoarthritis of knee    [facility] for Janne Hickory, MD    Office Visit

## 2022-03-28 ENCOUNTER — TELEPHONE (OUTPATIENT)
Dept: FAMILY MEDICINE CLINIC | Facility: CLINIC | Age: 62
End: 2022-03-28

## 2022-03-28 RX ORDER — BUDESONIDE AND FORMOTEROL FUMARATE DIHYDRATE 80; 4.5 UG/1; UG/1
2 AEROSOL RESPIRATORY (INHALATION) 2 TIMES DAILY
Qty: 10 G | Refills: 2 | Status: SHIPPED | OUTPATIENT
Start: 2022-03-28 | End: 2023-10-03

## 2022-03-28 NOTE — TELEPHONE ENCOUNTER
Prior Authorization Form has been filed out and faxed to [employer]  It can take 1-5 business days for it to come back.

## 2022-04-04 RX ORDER — BUDESONIDE AND FORMOTEROL FUMARATE DIHYDRATE 80; 4.5 UG/1; UG/1
2 AEROSOL RESPIRATORY (INHALATION) 2 TIMES DAILY
Qty: 10.2 G | Refills: 3 | Status: SHIPPED | OUTPATIENT
Start: 2022-04-04

## 2022-04-04 NOTE — TELEPHONE ENCOUNTER
Budesonide-Formoterol Fumarate 80-4.5 MCG/ACT Inhalation Aerosol was denied. Denial letter was faxed to office. Alternative is Symbicort. Please advise.

## 2022-05-27 ENCOUNTER — PATIENT MESSAGE (OUTPATIENT)
Dept: FAMILY MEDICINE CLINIC | Facility: CLINIC | Age: 62
End: 2022-05-27

## 2022-05-27 NOTE — TELEPHONE ENCOUNTER
From: Aldo Travis  To: Sintia Vargas MD  Sent: 5/27/2022 1:40 PM CDT  Subject: Vyvanse 40mg    Please refill medication. Last refill date 4/19/22. Medication is no longer listed in chart.  Last refill approved by DENI Robin    Thank you

## 2022-06-01 NOTE — TELEPHONE ENCOUNTER
I renewed medication but needs an appt. Needs to be seen every 6 months for this medication.  Res 24 ok

## 2022-06-01 NOTE — TELEPHONE ENCOUNTER
1st attempt - [employer] message sent for patient to contact the office to schedule an appointment; see notes below.

## 2022-06-03 LAB — AMB EXT COVID-19 RESULT: DETECTED

## 2022-06-06 ENCOUNTER — HOSPITAL ENCOUNTER (OUTPATIENT)
Age: 62
Discharge: STILL ADMITTED OR ADMINISTRATIVE | End: 2022-06-06
Payer: COMMERCIAL

## 2022-06-06 ENCOUNTER — TELEPHONE (OUTPATIENT)
Dept: FAMILY MEDICINE CLINIC | Facility: CLINIC | Age: 62
End: 2022-06-06

## 2022-06-06 NOTE — TELEPHONE ENCOUNTER
----- Message from Mavis Cortez RN sent at 6/6/2022 11:17 AM CDT -----  Regarding: FW: Covid positive       ----- Message -----  From: Wagner Fowler  Sent: 6/4/2022   1:51 PM CDT  To: Em Rn Triage  Subject: Covid positive                                   Home rapid Covid test is positive. Currently experiencing fatigue, headache, cough. Over the counter Tylenol being taken for headache. Please send RX for Covid.    Vaccinated and boosted x2    Thanks,    Marlen Martinez

## 2022-06-06 NOTE — TELEPHONE ENCOUNTER
Matamoros Nephew so she declines treatment? If BMI over 24, she would qualify for monoclonal Ab if she wants it.  Can go to  for eval

## 2022-06-06 NOTE — TELEPHONE ENCOUNTER
Patient states on Friday 6/3/22 she took a home covid test and it was positive. As of today she denies any s/sx.      ELLIOTI

## 2022-06-06 NOTE — TELEPHONE ENCOUNTER
Spoke with patient relayed PCP message. Patient states she will go to urgent care today or tomorrow morning for the MAB.

## 2022-07-06 ENCOUNTER — PATIENT MESSAGE (OUTPATIENT)
Dept: FAMILY MEDICINE CLINIC | Facility: CLINIC | Age: 62
End: 2022-07-06

## 2022-07-06 NOTE — TELEPHONE ENCOUNTER
Vyvanse 40 mg daily, 90 day panel pended for your approval    Please send, if appropriate        From: Kyra Travis  To: Yudelka Muse MD  Sent: 7/6/2022  9:51 AM CDT  Subject: Monthly refill request - Vyvanse 40mg    Please send appropriate monthly refill of Vyvanse 40mg to [place] on record. Currently only have enough medication through Friday 7/8/22. Thank you!     Lindsey Travis

## 2022-07-06 NOTE — TELEPHONE ENCOUNTER
Pt needs to see me every 6 months for this medication.  Once a year physical and 6 month follow up for med refills

## 2022-07-12 ENCOUNTER — TELEPHONE (OUTPATIENT)
Dept: INTERNAL MEDICINE CLINIC | Facility: CLINIC | Age: 62
End: 2022-07-12

## 2022-07-12 ENCOUNTER — OFFICE VISIT (OUTPATIENT)
Dept: FAMILY MEDICINE CLINIC | Facility: CLINIC | Age: 62
End: 2022-07-12
Payer: COMMERCIAL

## 2022-07-12 VITALS — SYSTOLIC BLOOD PRESSURE: 113 MMHG | DIASTOLIC BLOOD PRESSURE: 73 MMHG | HEART RATE: 89 BPM | TEMPERATURE: 98 F

## 2022-07-12 DIAGNOSIS — Z12.31 SCREENING MAMMOGRAM FOR BREAST CANCER: Primary | ICD-10-CM

## 2022-07-12 DIAGNOSIS — R63.2 BINGE EATING: ICD-10-CM

## 2022-07-12 DIAGNOSIS — E66.3 OVERWEIGHT (BMI 25.0-29.9): ICD-10-CM

## 2022-07-12 DIAGNOSIS — E04.1 THYROID NODULE: ICD-10-CM

## 2022-07-12 PROCEDURE — 99214 OFFICE O/P EST MOD 30 MIN: CPT | Performed by: FAMILY MEDICINE

## 2022-07-12 PROCEDURE — 3074F SYST BP LT 130 MM HG: CPT | Performed by: FAMILY MEDICINE

## 2022-07-12 PROCEDURE — 3078F DIAST BP <80 MM HG: CPT | Performed by: FAMILY MEDICINE

## 2022-08-09 ENCOUNTER — HOSPITAL ENCOUNTER (OUTPATIENT)
Dept: MAMMOGRAPHY | Age: 62
Discharge: HOME | End: 2022-08-09
Attending: FAMILY MEDICINE
Payer: COMMERCIAL

## 2022-08-09 DIAGNOSIS — Z12.31 SCREENING MAMMOGRAM FOR BREAST CANCER: ICD-10-CM

## 2022-08-09 PROCEDURE — 77067 SCR MAMMO BI INCL CAD: CPT | Performed by: FAMILY MEDICINE

## 2022-08-09 PROCEDURE — 77063 BREAST TOMOSYNTHESIS BI: CPT | Performed by: FAMILY MEDICINE

## 2022-09-01 ENCOUNTER — HOSPITAL ENCOUNTER (OUTPATIENT)
Dept: ULTRASOUND IMAGING | Age: 62
Discharge: HOME | End: 2022-09-01
Attending: FAMILY MEDICINE
Payer: COMMERCIAL

## 2022-09-01 DIAGNOSIS — E04.1 THYROID NODULE: ICD-10-CM

## 2022-09-01 PROCEDURE — 76536 US EXAM OF HEAD AND NECK: CPT | Performed by: FAMILY MEDICINE

## 2022-10-10 ENCOUNTER — PATIENT MESSAGE (OUTPATIENT)
Dept: FAMILY MEDICINE CLINIC | Facility: CLINIC | Age: 62
End: 2022-10-10

## 2022-10-10 NOTE — TELEPHONE ENCOUNTER
From: Anaid Travis  To: Keith Eckert MD  Sent: 10/10/2022 2:22 PM CDT  Subject: Annual Fasting Labs    Please order appropriate fasting labs to be completed in November, 2022. No office visits available until January, 2023.     Thanks,     Marlen Martinez

## 2022-10-11 ENCOUNTER — TELEPHONE (OUTPATIENT)
Dept: FAMILY MEDICINE CLINIC | Facility: CLINIC | Age: 62
End: 2022-10-11

## 2022-10-11 DIAGNOSIS — E55.9 VITAMIN D DEFICIENCY: ICD-10-CM

## 2022-10-11 DIAGNOSIS — Z00.00 ANNUAL PHYSICAL EXAM: Primary | ICD-10-CM

## 2022-10-11 RX ORDER — LISDEXAMFETAMINE DIMESYLATE 50 MG
CAPSULE ORAL
Qty: 30 CAPSULE | Refills: 0 | OUTPATIENT
Start: 2022-10-11

## 2022-10-11 RX ORDER — LISDEXAMFETAMINE DIMESYLATE 50 MG/1
50 TABLET, CHEWABLE ORAL DAILY
Qty: 30 TABLET | Refills: 0 | Status: SHIPPED | OUTPATIENT
Start: 2022-12-12 | End: 2023-01-11

## 2022-10-11 RX ORDER — LISDEXAMFETAMINE DIMESYLATE 50 MG/1
50 TABLET, CHEWABLE ORAL DAILY
Qty: 30 TABLET | Refills: 0 | Status: SHIPPED | OUTPATIENT
Start: 2022-11-11 | End: 2022-12-11

## 2022-10-11 RX ORDER — LISDEXAMFETAMINE DIMESYLATE 50 MG/1
50 TABLET, CHEWABLE ORAL DAILY
Qty: 30 TABLET | Refills: 0 | Status: SHIPPED | OUTPATIENT
Start: 2022-10-11 | End: 2022-11-10

## 2022-10-11 NOTE — TELEPHONE ENCOUNTER
Please review refill failed/no protocol     Requested Prescriptions     Pending Prescriptions Disp Refills    Lisdexamfetamine Dimesylate (VYVANSE) 50 MG Oral Chew Tab 30 tablet 0     Sig: Chew 50 mg by mouth daily. Lisdexamfetamine Dimesylate (VYVANSE) 50 MG Oral Chew Tab 30 tablet 0     Sig: Chew 50 mg by mouth daily. Lisdexamfetamine Dimesylate (VYVANSE) 50 MG Oral Chew Tab 30 tablet 0     Sig: Chew 50 mg by mouth daily. Refused Prescriptions Disp Refills    VYVANSE 48 MG Oral Cap [Pharmacy Med Name: Vyvanse 50 Mg Cap Gloria] 30 capsule 0     Sig: TAKE ONE CAPSULE BY MOUTH ONE TIME DAILY     Refused By: Nova Ortega     Reason for Refusal: Request already responded to by other means (e.g. phone or fax)         Recent Visits  Date Type Provider Dept   07/12/22 Office Visit [facility]   [facility]02[facility]21 [facility]   Showing recent visits within past 540 days with a meds authorizing provider and meeting all other requirements  Future Appointments  No visits were found meeting these conditions. Showing future appointments within next 150 days with a meds authorizing provider and meeting all other requirements    Requested Prescriptions   Pending Prescriptions Disp Refills    Lisdexamfetamine Dimesylate (VYVANSE) 50 MG Oral Chew Tab 30 tablet 0     Sig: Chew 50 mg by mouth daily. There is no refill protocol information for this order        Lisdexamfetamine Dimesylate (VYVANSE) 50 MG Oral Chew Tab 30 tablet 0     Sig: Chew 50 mg by mouth daily. There is no refill protocol information for this order        Lisdexamfetamine Dimesylate (VYVANSE) 50 MG Oral Chew Tab 30 tablet 0     Sig: Chew 50 mg by mouth daily.         There is no refill protocol information for this order       Refused Prescriptions Disp Refills    VYVANSE 50 MG Oral Cap [Pharmacy Med Name: Vyvanse 50 Mg Cap Gloria] 30 capsule 0     Sig: TAKE ONE CAPSULE BY MOUTH ONE TIME DAILY        There is no refill protocol information for this order           Recent Outpatient Visits              3 months ago Screening mammogram for breast cancer    [facility], [facility], Erasmo Chandler MD    Office Visit    11 months ago Annual physical exam    [address] [place] [place]    Office Visit    1 year ago Bilateral primary osteoarthritis of knee    [facility] for Hair Topete MD    Office Visit    1 year ago Bilateral primary osteoarthritis of knee    [facility] for Hair Topete MD    Office Visit    1 year ago Primary osteoarthritis of both [address] for Hair Topete MD    Office Visit

## 2022-10-14 ENCOUNTER — PATIENT MESSAGE (OUTPATIENT)
Dept: FAMILY MEDICINE CLINIC | Facility: CLINIC | Age: 62
End: 2022-10-14

## 2022-10-14 RX ORDER — MONTELUKAST SODIUM 10 MG/1
10 TABLET ORAL DAILY
Qty: 90 TABLET | Refills: 3 | Status: SHIPPED | OUTPATIENT
Start: 2022-10-14 | End: 2023-10-09

## 2022-10-14 RX ORDER — ATORVASTATIN CALCIUM 40 MG/1
40 TABLET, FILM COATED ORAL NIGHTLY
Qty: 90 TABLET | Refills: 3 | Status: SHIPPED | OUTPATIENT
Start: 2022-10-14

## 2022-10-14 RX ORDER — LEVOTHYROXINE SODIUM 112 UG/1
112 TABLET ORAL
Qty: 90 TABLET | Refills: 3 | Status: SHIPPED | OUTPATIENT
Start: 2022-10-14

## 2022-10-14 RX ORDER — BUDESONIDE AND FORMOTEROL FUMARATE DIHYDRATE 80; 4.5 UG/1; UG/1
2 AEROSOL RESPIRATORY (INHALATION) 2 TIMES DAILY
Qty: 10.2 G | Refills: 3 | Status: SHIPPED | OUTPATIENT
Start: 2022-10-14

## 2022-10-14 NOTE — TELEPHONE ENCOUNTER
From: Henny Travis  To: Ebony Yoo MD  Sent: 10/14/2022 1:45 PM CDT  Subject: Vyvanse refill    I just received a call from [employer] stating that my refill request for Vyvanse 50mg - capsules has been denied by Dr. Kim Mabry. Can you please resend the refill - [employer] seems to be confused regarding the refill approval - I do see in my med list that 3 refills have been sent on 10/11/22 covering (October, November, and December). Also I noticed that the medication was changed from Capsule form to chewable - I am absolutely OK with capsule form of this medication.      Thank you for your assistance in this matter,    Lindsey Travis

## 2022-10-25 ENCOUNTER — PATIENT MESSAGE (OUTPATIENT)
Dept: FAMILY MEDICINE CLINIC | Facility: CLINIC | Age: 62
End: 2022-10-25

## 2022-10-26 NOTE — TELEPHONE ENCOUNTER
From: Jules Travis  To: Shreyas Bello MD  Sent: 10/25/2022 6:30 PM CDT  Subject: Vaccine updates    Please update my vaccine record in chart to reflect the following:    Fluzone Quadrivalent - [address] - rec'd 10/12/22 (given at work - [address])    [address] - Ezzard Kiera - rec'd 10/24/22 @ [facility]ura    I will be happy to attach documentation if needed.      Thanks,     Vincenzo Tejeda

## 2022-11-10 ENCOUNTER — LAB ENCOUNTER (OUTPATIENT)
Dept: LAB | Age: 62
End: 2022-11-10
Attending: FAMILY MEDICINE
Payer: COMMERCIAL

## 2022-11-10 DIAGNOSIS — Z00.00 ANNUAL PHYSICAL EXAM: ICD-10-CM

## 2022-11-10 DIAGNOSIS — E55.9 VITAMIN D DEFICIENCY: ICD-10-CM

## 2022-11-10 LAB
ALBUMIN SERPL-MCNC: 3.7 G/DL (ref 3.4–5)
ALBUMIN/GLOB SERPL: 1.3 {RATIO} (ref 1–2)
ALP LIVER SERPL-CCNC: 114 U/L
ALT SERPL-CCNC: 34 U/L
ANION GAP SERPL CALC-SCNC: 6 MMOL/L (ref 0–18)
AST SERPL-CCNC: 21 U/L (ref 15–37)
BASOPHILS # BLD AUTO: 0.09 X10(3) UL (ref 0–0.2)
BASOPHILS NFR BLD AUTO: 1.4 %
BILIRUB SERPL-MCNC: 0.7 MG/DL (ref 0.1–2)
BUN BLD-MCNC: 11 MG/DL (ref 7–18)
BUN/CREAT SERPL: 15.9 (ref 10–20)
CALCIUM BLD-MCNC: 9.8 MG/DL (ref 8.5–10.1)
CHLORIDE SERPL-SCNC: 108 MMOL/L (ref 98–112)
CHOLEST SERPL-MCNC: 146 MG/DL (ref ?–200)
CO2 SERPL-SCNC: 27 MMOL/L (ref 21–32)
CREAT BLD-MCNC: 0.69 MG/DL
DEPRECATED RDW RBC AUTO: 42.9 FL (ref 35.1–46.3)
EOSINOPHIL # BLD AUTO: 0.1 X10(3) UL (ref 0–0.7)
EOSINOPHIL NFR BLD AUTO: 1.6 %
ERYTHROCYTE [DISTWIDTH] IN BLOOD BY AUTOMATED COUNT: 12.5 % (ref 11–15)
FASTING PATIENT LIPID ANSWER: YES
FASTING STATUS PATIENT QL REPORTED: YES
GFR SERPLBLD BASED ON 1.73 SQ M-ARVRAT: 97 ML/MIN/1.73M2 (ref 60–?)
GLOBULIN PLAS-MCNC: 2.9 G/DL (ref 2.8–4.4)
GLUCOSE BLD-MCNC: 85 MG/DL (ref 70–99)
HCT VFR BLD AUTO: 44 %
HDLC SERPL-MCNC: 66 MG/DL (ref 40–59)
HGB BLD-MCNC: 14.2 G/DL
IMM GRANULOCYTES # BLD AUTO: 0.02 X10(3) UL (ref 0–1)
IMM GRANULOCYTES NFR BLD: 0.3 %
LDLC SERPL CALC-MCNC: 58 MG/DL (ref ?–100)
LYMPHOCYTES # BLD AUTO: 1.75 X10(3) UL (ref 1–4)
LYMPHOCYTES NFR BLD AUTO: 27.2 %
MCH RBC QN AUTO: 29.6 PG (ref 26–34)
MCHC RBC AUTO-ENTMCNC: 32.3 G/DL (ref 31–37)
MCV RBC AUTO: 91.7 FL
MONOCYTES # BLD AUTO: 0.56 X10(3) UL (ref 0.1–1)
MONOCYTES NFR BLD AUTO: 8.7 %
NEUTROPHILS # BLD AUTO: 3.91 X10 (3) UL (ref 1.5–7.7)
NEUTROPHILS # BLD AUTO: 3.91 X10(3) UL (ref 1.5–7.7)
NEUTROPHILS NFR BLD AUTO: 60.8 %
NONHDLC SERPL-MCNC: 80 MG/DL (ref ?–130)
OSMOLALITY SERPL CALC.SUM OF ELEC: 291 MOSM/KG (ref 275–295)
PLATELET # BLD AUTO: 369 10(3)UL (ref 150–450)
POTASSIUM SERPL-SCNC: 4.1 MMOL/L (ref 3.5–5.1)
PROT SERPL-MCNC: 6.6 G/DL (ref 6.4–8.2)
RBC # BLD AUTO: 4.8 X10(6)UL
SODIUM SERPL-SCNC: 141 MMOL/L (ref 136–145)
TRIGL SERPL-MCNC: 127 MG/DL (ref 30–149)
TSI SER-ACNC: 0.54 MIU/ML (ref 0.36–3.74)
VIT B12 SERPL-MCNC: 356 PG/ML (ref 193–986)
VIT D+METAB SERPL-MCNC: 50.2 NG/ML (ref 30–100)
VLDLC SERPL CALC-MCNC: 19 MG/DL (ref 0–30)
WBC # BLD AUTO: 6.4 X10(3) UL (ref 4–11)

## 2022-11-10 PROCEDURE — 82607 VITAMIN B-12: CPT

## 2022-11-10 PROCEDURE — 85025 COMPLETE CBC W/AUTO DIFF WBC: CPT

## 2022-11-10 PROCEDURE — 36415 COLL VENOUS BLD VENIPUNCTURE: CPT

## 2022-11-10 PROCEDURE — 80061 LIPID PANEL: CPT

## 2022-11-10 PROCEDURE — 84443 ASSAY THYROID STIM HORMONE: CPT

## 2022-11-10 PROCEDURE — 82306 VITAMIN D 25 HYDROXY: CPT

## 2022-11-10 PROCEDURE — 80053 COMPREHEN METABOLIC PANEL: CPT

## 2022-11-14 NOTE — PROGRESS NOTES
Micheal Portillo - Your labs are all stable with current medications.  Continue the same. - Dr. Ezra Avitia

## 2022-11-21 NOTE — TELEPHONE ENCOUNTER
Please review; no protocol    Requested Prescriptions   Pending Prescriptions Disp Refills    lisdexamfetamine (VYVANSE) 50 MG Oral Cap 30 capsule 0     Sig: Take 1 capsule (50 mg total) by mouth daily. There is no refill protocol information for this order          Future Appointments         Provider Department Appt Notes    In 3 weeks Shannon Merchant  [facility] results. Alternative RX for weight loss - weekly injections? ??     In 1 month Marleni Lopez, [facility], [facility] PAP test - lack of sexual desire -          Recent Outpatient Visits              4 months ago Screening mammogram for breast cancer    [address] Rochele Golds, MD    Office Visit    1 year ago Annual physical exam    150 Jessie Scott MD    Office Visit    1 year ago Bilateral primary osteoarthritis of knee    [facility], [address], Frieda Nieto MD    Office Visit    1 year ago Bilateral primary osteoarthritis of knee    [facility], [address], Frieda Nieto MD    Office Visit    1 year ago Primary osteoarthritis of both [facility] for Garrett Gaines MD    Office Visit

## 2022-12-07 RX ORDER — ATORVASTATIN CALCIUM 40 MG/1
40 TABLET, FILM COATED ORAL NIGHTLY
Qty: 90 TABLET | Refills: 3 | Status: CANCELLED | OUTPATIENT
Start: 2022-12-07

## 2022-12-07 RX ORDER — MONTELUKAST SODIUM 10 MG/1
10 TABLET ORAL DAILY
Qty: 90 TABLET | Refills: 3 | Status: CANCELLED | OUTPATIENT
Start: 2022-12-07 | End: 2023-12-02

## 2022-12-07 RX ORDER — LEVOTHYROXINE SODIUM 112 UG/1
112 TABLET ORAL
Qty: 90 TABLET | Refills: 3 | Status: CANCELLED | OUTPATIENT
Start: 2022-12-07

## 2022-12-09 ENCOUNTER — PATIENT MESSAGE (OUTPATIENT)
Dept: OBGYN CLINIC | Facility: CLINIC | Age: 62
End: 2022-12-09

## 2022-12-09 NOTE — TELEPHONE ENCOUNTER
From: Lul Travis  To: DENI Bautista  Sent: 12/9/2022 10:56 AM CST  Subject: Past acquaintance    Hi Wander Brandon,  I'm not sure if you remember but we had worked together at [facility]. If you are uncomfortable about me seeing you for my PAP and other Gyne concerns, please let me know. I would be happy to reschedule with another provider within the practice.      Lindsey Travis

## 2022-12-22 NOTE — TELEPHONE ENCOUNTER
Protocol failed or has No Protocol, please review  Medication has no protocol, med pended for your review/ approval     Requested Prescriptions   Pending Prescriptions Disp Refills    lisdexamfetamine (VYVANSE) 50 MG Oral Cap 30 capsule 0     Sig: Take 1 capsule (50 mg total) by mouth daily.        There is no refill protocol information for this order        Future Appointments         Provider Department Appt Notes    In 2 weeks Pranay Santana, [facility], [address] PAP test - lack of sexual desire -          Recent Outpatient Visits              5 months ago Screening mammogram for breast cancer    [address], Kristen Cui MD    Office Visit    1 year ago Annual physical exam    150 Erasmo Scott MD    Office Visit    1 year ago Bilateral primary osteoarthritis of knee    [facility] for Hair Topete MD    Office Visit    1 year ago Bilateral primary osteoarthritis of knee    [facility] for Hair Topete MD    Office Visit    1 year ago Primary osteoarthritis of both [address] for Junior Monroy, Mack Ho MD    Office Visit

## 2023-01-07 ENCOUNTER — OFFICE VISIT (OUTPATIENT)
Dept: OBGYN CLINIC | Facility: CLINIC | Age: 63
End: 2023-01-07
Payer: COMMERCIAL

## 2023-01-07 VITALS
HEIGHT: 68 IN | DIASTOLIC BLOOD PRESSURE: 75 MMHG | BODY MASS INDEX: 27.13 KG/M2 | SYSTOLIC BLOOD PRESSURE: 115 MMHG | HEART RATE: 94 BPM | WEIGHT: 179 LBS

## 2023-01-07 DIAGNOSIS — Z12.4 SCREENING FOR MALIGNANT NEOPLASM OF CERVIX: ICD-10-CM

## 2023-01-07 DIAGNOSIS — Z01.419 WELL WOMAN EXAM WITH ROUTINE GYNECOLOGICAL EXAM: Primary | ICD-10-CM

## 2023-01-07 DIAGNOSIS — N93.0 POSTCOITAL BLEEDING: ICD-10-CM

## 2023-01-07 DIAGNOSIS — N95.2 POST-MENOPAUSAL ATROPHIC VAGINITIS: ICD-10-CM

## 2023-01-07 PROCEDURE — 3074F SYST BP LT 130 MM HG: CPT | Performed by: NURSE PRACTITIONER

## 2023-01-07 PROCEDURE — 99386 PREV VISIT NEW AGE 40-64: CPT | Performed by: NURSE PRACTITIONER

## 2023-01-07 PROCEDURE — 3008F BODY MASS INDEX DOCD: CPT | Performed by: NURSE PRACTITIONER

## 2023-01-07 PROCEDURE — 3078F DIAST BP <80 MM HG: CPT | Performed by: NURSE PRACTITIONER

## 2023-01-09 LAB — HPV I/H RISK 1 DNA SPEC QL NAA+PROBE: NEGATIVE

## 2023-01-24 ENCOUNTER — PATIENT MESSAGE (OUTPATIENT)
Dept: FAMILY MEDICINE CLINIC | Facility: CLINIC | Age: 63
End: 2023-01-24

## 2023-01-24 NOTE — TELEPHONE ENCOUNTER
From: Vanessa Travis  To: Rosalind Rodrigez MD  Sent: 1/24/2023 1:28 PM CST  Subject: Vyvanse 50mg refills     Please approve refill request for the next 3 months.      Thank you

## 2023-01-25 NOTE — TELEPHONE ENCOUNTER
Please review refill protocol failed/ no protocol  Requested Prescriptions   Pending Prescriptions Disp Refills    lisdexamfetamine (VYVANSE) 50 MG Oral Cap 30 capsule 0     Sig: Take 1 capsule (50 mg total) by mouth daily. There is no refill protocol information for this order       lisdexamfetamine (VYVANSE) 50 MG Oral Cap 30 capsule 0     Sig: Take 1 capsule (50 mg total) by mouth daily. There is no refill protocol information for this order       lisdexamfetamine (VYVANSE) 50 MG Oral Cap 30 capsule 0     Sig: Take 1 capsule (50 mg total) by mouth daily.        There is no refill protocol information for this order

## 2023-02-07 ENCOUNTER — TELEMEDICINE (OUTPATIENT)
Dept: INTERNAL MEDICINE CLINIC | Facility: CLINIC | Age: 63
End: 2023-02-07

## 2023-02-07 ENCOUNTER — NURSE TRIAGE (OUTPATIENT)
Dept: FAMILY MEDICINE CLINIC | Facility: CLINIC | Age: 63
End: 2023-02-07

## 2023-02-07 DIAGNOSIS — H10.33 ACUTE BACTERIAL CONJUNCTIVITIS OF BOTH EYES: Primary | ICD-10-CM

## 2023-02-07 PROCEDURE — 99213 OFFICE O/P EST LOW 20 MIN: CPT | Performed by: NURSE PRACTITIONER

## 2023-02-07 RX ORDER — TOBRAMYCIN 3 MG/ML
2 SOLUTION/ DROPS OPHTHALMIC EVERY 4 HOURS
Qty: 5 ML | Refills: 0 | Status: SHIPPED | OUTPATIENT
Start: 2023-02-07 | End: 2023-02-13

## 2023-02-07 NOTE — TELEPHONE ENCOUNTER
Scheduling change needed r/t not avail resource; I rescheduled for:    Future Appointments   Date Time Provider Ludwin Proctor   2/7/2023  6:20 PM DENI Velazquez Cousin     I called patient and made her aware of above. Patient verbalized understanding. No further questions or concerns at this time.

## 2023-02-08 ENCOUNTER — E-VISIT (OUTPATIENT)
Dept: TELEHEALTH | Age: 63
End: 2023-02-08

## 2023-02-08 DIAGNOSIS — U07.1 COVID-19: Primary | ICD-10-CM

## 2023-02-08 PROCEDURE — 99421 OL DIG E/M SVC 5-10 MIN: CPT | Performed by: NURSE PRACTITIONER

## 2023-02-08 RX ORDER — ALBUTEROL SULFATE 90 UG/1
2 AEROSOL, METERED RESPIRATORY (INHALATION) EVERY 4 HOURS PRN
Qty: 1 EACH | Refills: 1 | Status: SHIPPED | OUTPATIENT
Start: 2023-02-08

## 2023-02-09 NOTE — PROGRESS NOTES
Refer to patient Questionnaire and responses. See MyChart messages. 10 minutes spent in direct communication with patient via [facility].

## 2023-02-12 ENCOUNTER — E-VISIT (OUTPATIENT)
Dept: TELEHEALTH | Age: 63
End: 2023-02-12

## 2023-02-12 DIAGNOSIS — Z02.9 ADMINISTRATIVE ENCOUNTER: Primary | ICD-10-CM

## 2023-02-13 ENCOUNTER — OFFICE VISIT (OUTPATIENT)
Dept: FAMILY MEDICINE CLINIC | Facility: CLINIC | Age: 63
End: 2023-02-13

## 2023-02-13 VITALS
HEIGHT: 68 IN | HEART RATE: 120 BPM | TEMPERATURE: 98 F | BODY MASS INDEX: 27.89 KG/M2 | WEIGHT: 184 LBS | SYSTOLIC BLOOD PRESSURE: 126 MMHG | DIASTOLIC BLOOD PRESSURE: 80 MMHG

## 2023-02-13 DIAGNOSIS — U07.1 COVID-19: Primary | ICD-10-CM

## 2023-02-13 DIAGNOSIS — H66.91 OTITIS, RIGHT: ICD-10-CM

## 2023-02-13 PROCEDURE — 3079F DIAST BP 80-89 MM HG: CPT | Performed by: FAMILY MEDICINE

## 2023-02-13 PROCEDURE — 3074F SYST BP LT 130 MM HG: CPT | Performed by: FAMILY MEDICINE

## 2023-02-13 PROCEDURE — 99213 OFFICE O/P EST LOW 20 MIN: CPT | Performed by: FAMILY MEDICINE

## 2023-02-13 PROCEDURE — 3008F BODY MASS INDEX DOCD: CPT | Performed by: FAMILY MEDICINE

## 2023-02-13 RX ORDER — CLINDAMYCIN HYDROCHLORIDE 300 MG/1
300 CAPSULE ORAL 3 TIMES DAILY
Qty: 30 CAPSULE | Refills: 0 | Status: SHIPPED | OUTPATIENT
Start: 2023-02-13

## 2023-02-13 RX ORDER — TOBRAMYCIN AND DEXAMETHASONE 3; 1 MG/ML; MG/ML
1 SUSPENSION/ DROPS OPHTHALMIC
Qty: 10 ML | Refills: 0 | Status: SHIPPED | OUTPATIENT
Start: 2023-02-13

## 2023-02-20 ENCOUNTER — PATIENT MESSAGE (OUTPATIENT)
Dept: FAMILY MEDICINE CLINIC | Facility: CLINIC | Age: 63
End: 2023-02-20

## 2023-02-21 ENCOUNTER — APPOINTMENT (OUTPATIENT)
Dept: GENERAL RADIOLOGY | Facility: HOSPITAL | Age: 63
End: 2023-02-21
Attending: EMERGENCY MEDICINE
Payer: COMMERCIAL

## 2023-02-21 ENCOUNTER — E-VISIT (OUTPATIENT)
Dept: TELEHEALTH | Age: 63
End: 2023-02-21

## 2023-02-21 ENCOUNTER — HOSPITAL ENCOUNTER (EMERGENCY)
Facility: HOSPITAL | Age: 63
Discharge: HOME | End: 2023-02-21
Attending: EMERGENCY MEDICINE
Payer: COMMERCIAL

## 2023-02-21 ENCOUNTER — APPOINTMENT (OUTPATIENT)
Dept: CT IMAGING | Facility: HOSPITAL | Age: 63
End: 2023-02-21
Attending: EMERGENCY MEDICINE
Payer: COMMERCIAL

## 2023-02-21 VITALS
RESPIRATION RATE: 21 BRPM | HEIGHT: 67 IN | TEMPERATURE: 99 F | BODY MASS INDEX: 28.25 KG/M2 | DIASTOLIC BLOOD PRESSURE: 77 MMHG | WEIGHT: 180 LBS | HEART RATE: 103 BPM | OXYGEN SATURATION: 94 % | SYSTOLIC BLOOD PRESSURE: 130 MMHG

## 2023-02-21 DIAGNOSIS — J01.90 ACUTE SINUSITIS, RECURRENCE NOT SPECIFIED, UNSPECIFIED LOCATION: ICD-10-CM

## 2023-02-21 DIAGNOSIS — R06.00 DYSPNEA, UNSPECIFIED TYPE: ICD-10-CM

## 2023-02-21 DIAGNOSIS — Z02.9 ADMINISTRATIVE ENCOUNTER: Primary | ICD-10-CM

## 2023-02-21 DIAGNOSIS — R05.9 COUGH, UNSPECIFIED TYPE: Primary | ICD-10-CM

## 2023-02-21 LAB
ANION GAP SERPL CALC-SCNC: 9 MMOL/L (ref 0–18)
ATRIAL RATE: 99 BPM
BASOPHILS # BLD AUTO: 0.08 X10(3) UL (ref 0–0.2)
BASOPHILS NFR BLD AUTO: 0.7 %
BUN BLD-MCNC: 10 MG/DL (ref 7–18)
BUN/CREAT SERPL: 12.2 (ref 10–20)
CALCIUM BLD-MCNC: 9.4 MG/DL (ref 8.5–10.1)
CHLORIDE SERPL-SCNC: 105 MMOL/L (ref 98–112)
CO2 SERPL-SCNC: 25 MMOL/L (ref 21–32)
CREAT BLD-MCNC: 0.82 MG/DL
D DIMER PPP FEU-MCNC: 1.55 UG/ML FEU (ref ?–0.63)
DEPRECATED RDW RBC AUTO: 41.1 FL (ref 35.1–46.3)
EOSINOPHIL # BLD AUTO: 0.04 X10(3) UL (ref 0–0.7)
EOSINOPHIL NFR BLD AUTO: 0.3 %
ERYTHROCYTE [DISTWIDTH] IN BLOOD BY AUTOMATED COUNT: 12.8 % (ref 11–15)
FLUAV + FLUBV RNA SPEC NAA+PROBE: NEGATIVE
FLUAV + FLUBV RNA SPEC NAA+PROBE: NEGATIVE
GFR SERPLBLD BASED ON 1.73 SQ M-ARVRAT: 80 ML/MIN/1.73M2 (ref 60–?)
GLUCOSE BLD-MCNC: 104 MG/DL (ref 70–99)
HCT VFR BLD AUTO: 40 %
HGB BLD-MCNC: 13.5 G/DL
IMM GRANULOCYTES # BLD AUTO: 0.09 X10(3) UL (ref 0–1)
IMM GRANULOCYTES NFR BLD: 0.8 %
LYMPHOCYTES # BLD AUTO: 1.21 X10(3) UL (ref 1–4)
LYMPHOCYTES NFR BLD AUTO: 10.4 %
MCH RBC QN AUTO: 29.6 PG (ref 26–34)
MCHC RBC AUTO-ENTMCNC: 33.8 G/DL (ref 31–37)
MCV RBC AUTO: 87.7 FL
MONOCYTES # BLD AUTO: 1.55 X10(3) UL (ref 0.1–1)
MONOCYTES NFR BLD AUTO: 13.4 %
NEUTROPHILS # BLD AUTO: 8.63 X10 (3) UL (ref 1.5–7.7)
NEUTROPHILS # BLD AUTO: 8.63 X10(3) UL (ref 1.5–7.7)
NEUTROPHILS NFR BLD AUTO: 74.4 %
NT-PROBNP SERPL-MCNC: 84 PG/ML (ref ?–125)
OSMOLALITY SERPL CALC.SUM OF ELEC: 287 MOSM/KG (ref 275–295)
P AXIS: 57 DEGREES
P-R INTERVAL: 138 MS
PLATELET # BLD AUTO: 426 10(3)UL (ref 150–450)
POTASSIUM SERPL-SCNC: 3.7 MMOL/L (ref 3.5–5.1)
Q-T INTERVAL: 350 MS
QRS DURATION: 72 MS
QTC CALCULATION (BEZET): 449 MS
R AXIS: 61 DEGREES
RBC # BLD AUTO: 4.56 X10(6)UL
RSV RNA SPEC NAA+PROBE: NEGATIVE
SARS-COV-2 RNA RESP QL NAA+PROBE: DETECTED
SODIUM SERPL-SCNC: 139 MMOL/L (ref 136–145)
T AXIS: 61 DEGREES
TROPONIN I HIGH SENSITIVITY: 4 NG/L
VENTRICULAR RATE: 99 BPM
WBC # BLD AUTO: 11.6 X10(3) UL (ref 4–11)

## 2023-02-21 PROCEDURE — 99285 EMERGENCY DEPT VISIT HI MDM: CPT

## 2023-02-21 PROCEDURE — 94640 AIRWAY INHALATION TREATMENT: CPT

## 2023-02-21 PROCEDURE — 80048 BASIC METABOLIC PNL TOTAL CA: CPT | Performed by: EMERGENCY MEDICINE

## 2023-02-21 PROCEDURE — 71046 X-RAY EXAM CHEST 2 VIEWS: CPT | Performed by: EMERGENCY MEDICINE

## 2023-02-21 PROCEDURE — 80048 BASIC METABOLIC PNL TOTAL CA: CPT

## 2023-02-21 PROCEDURE — 93005 ELECTROCARDIOGRAM TRACING: CPT

## 2023-02-21 PROCEDURE — 85025 COMPLETE CBC W/AUTO DIFF WBC: CPT | Performed by: EMERGENCY MEDICINE

## 2023-02-21 PROCEDURE — 0241U SARS-COV-2/FLU A AND B/RSV BY PCR (GENEXPERT): CPT | Performed by: EMERGENCY MEDICINE

## 2023-02-21 PROCEDURE — 85379 FIBRIN DEGRADATION QUANT: CPT | Performed by: EMERGENCY MEDICINE

## 2023-02-21 PROCEDURE — 96374 THER/PROPH/DIAG INJ IV PUSH: CPT

## 2023-02-21 PROCEDURE — 93010 ELECTROCARDIOGRAM REPORT: CPT

## 2023-02-21 PROCEDURE — 71260 CT THORAX DX C+: CPT | Performed by: EMERGENCY MEDICINE

## 2023-02-21 PROCEDURE — 83880 ASSAY OF NATRIURETIC PEPTIDE: CPT | Performed by: EMERGENCY MEDICINE

## 2023-02-21 PROCEDURE — 84484 ASSAY OF TROPONIN QUANT: CPT | Performed by: EMERGENCY MEDICINE

## 2023-02-21 PROCEDURE — 85025 COMPLETE CBC W/AUTO DIFF WBC: CPT

## 2023-02-21 PROCEDURE — 96361 HYDRATE IV INFUSION ADD-ON: CPT

## 2023-02-21 RX ORDER — METHYLPREDNISOLONE SODIUM SUCCINATE 125 MG/2ML
125 INJECTION, POWDER, LYOPHILIZED, FOR SOLUTION INTRAMUSCULAR; INTRAVENOUS ONCE
Status: COMPLETED | OUTPATIENT
Start: 2023-02-21 | End: 2023-02-21

## 2023-02-21 RX ORDER — DOXYCYCLINE HYCLATE 100 MG/1
100 CAPSULE ORAL 2 TIMES DAILY
Qty: 14 CAPSULE | Refills: 0 | Status: SHIPPED | OUTPATIENT
Start: 2023-02-21 | End: 2023-02-28

## 2023-02-21 RX ORDER — PREDNISONE 20 MG/1
40 TABLET ORAL DAILY
Qty: 10 TABLET | Refills: 0 | Status: SHIPPED | OUTPATIENT
Start: 2023-02-21 | End: 2023-02-26

## 2023-02-21 RX ORDER — IPRATROPIUM BROMIDE AND ALBUTEROL SULFATE 2.5; .5 MG/3ML; MG/3ML
3 SOLUTION RESPIRATORY (INHALATION) ONCE
Status: COMPLETED | OUTPATIENT
Start: 2023-02-21 | End: 2023-02-21

## 2023-02-21 NOTE — TELEPHONE ENCOUNTER
See E-visit from today:    Progress Notes  Gabriella Kerr PA-C (Physician Assistant) Alisson Clements Physician Assistant  Referred to [facility].   No charge

## 2023-02-21 NOTE — DISCHARGE INSTRUCTIONS
Return to emergency department for any worsening symptoms including but not limited to worsening shortness of breath, chest pain, symptoms with exertion, lower extremity swelling, weakness, numbness, abdominal pain, etc. Please follow with your primary care provider in the next few days for reevaluation of your symptoms. The Emergency Department is not intended to be a substitute for an effort to provide complete medical care. The imaging, if any, have often been interpreted on a preliminary basis pending final reading by the radiologist. If your blood pressure was greater than 140/90, please have this blood pressure rechecked by your primary care provider in the next several days.

## 2023-02-21 NOTE — ED INITIAL ASSESSMENT (HPI)
Patient presents to ER With complaints of some increased MENDIOLA. Patient notes she is on day 14 of COVID, concerns that O2 sats are around 93 or below and -130 on home monitor.

## 2023-05-01 ENCOUNTER — PATIENT MESSAGE (OUTPATIENT)
Dept: FAMILY MEDICINE CLINIC | Facility: CLINIC | Age: 63
End: 2023-05-01

## 2023-05-02 NOTE — TELEPHONE ENCOUNTER
From: Fran Nguyen  To: Ashley Kirkpatrick MD  Sent: 5/1/2023 12:08 PM CDT  Subject: Remote work from home    Good Afternoon Dr. Iglesia Conner -    I am requesting a letter from you stating that my health is being effected by the tremendous amount of stress in my current working office. 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 The stress has become increasingly evident and has recently been effecting my home life, and health. I have found myself to be more forgetful, irritable, overall not happy, and by Friday evening am so stressed I am a nervous wreck. My weekends are consumed with dread to start the next week. [employer] does have a policy to allow 908% remote work from home. Currently, my employer is trying to say that partial remote would be appropriate. As they are not my PCP I find this statement not acceptable. Therefore, I am asking for a letter of medical necessity from you addressing my current symptoms and your recommendation that I work 100% remote. If you require me to schedule OV I will be happy to do so.      Thanks for your consideration,    Nicholas Christianson

## 2023-05-08 ENCOUNTER — TELEPHONE (OUTPATIENT)
Dept: FAMILY MEDICINE CLINIC | Facility: CLINIC | Age: 63
End: 2023-05-08

## 2023-05-08 ENCOUNTER — PATIENT MESSAGE (OUTPATIENT)
Dept: FAMILY MEDICINE CLINIC | Facility: CLINIC | Age: 63
End: 2023-05-08

## 2023-05-08 PROBLEM — F41.0 PANIC ATTACKS: Status: ACTIVE | Noted: 2023-05-08

## 2023-05-08 PROBLEM — Z56.6 STRESS AT WORK: Status: ACTIVE | Noted: 2023-05-08

## 2023-05-08 PROBLEM — J44.9 CHRONIC OBSTRUCTIVE PULMONARY DISEASE, UNSPECIFIED (HCC): Status: ACTIVE | Noted: 2023-05-08

## 2023-05-09 ENCOUNTER — PATIENT MESSAGE (OUTPATIENT)
Dept: FAMILY MEDICINE CLINIC | Facility: CLINIC | Age: 63
End: 2023-05-09

## 2023-05-09 NOTE — TELEPHONE ENCOUNTER
From: Asa Bernal  To: Liz Jett MD  Sent: 5/8/2023 5:00 PM CDT  Subject: Refill request    Can you please send refill of Vyvanse 50mg tablets. My current prescrption will be completed on Wednesday, May 10, 2023.      Thank you,    Celestino Mercado

## 2023-05-09 NOTE — TELEPHONE ENCOUNTER
Last Office Visit with DENI Feliz yesterday, 5/8/23     DENI Bernard, please see [employer] message and advise

## 2023-05-10 NOTE — TELEPHONE ENCOUNTER
From: DENI Juan  To: Cecy Marinelli  Sent: 5/9/2023 3:12 PM CDT  Subject: short term disability    I think that if you can start your disability this week we should aim for that. Let me place a referral to our behavioral health navigator-they will contact you with referrals for a counselor for you to talk with. Please let me know if you have any questions or concerns.    I hope this helps -MOISÉS Juan, FNP-C

## 2023-05-19 NOTE — TELEPHONE ENCOUNTER
19-May-2023 14:17 See 8/15/18 Refill Encounter. After refilling Dr. Gilberto Sifuentes said pt needs to be seen. ANURADHA---Please assist pt to schedule a follow up visit w Dr. Gilberto Sifuentes if she calls back.

## 2023-05-24 NOTE — TELEPHONE ENCOUNTER
Ed Tadeo is seeking to be off from 05/11/23-09/04/23 due to anxiety- work related, panic attacks, depression. Do you support?       Thanks    Freya Mohs

## 2023-05-30 ENCOUNTER — PATIENT MESSAGE (OUTPATIENT)
Dept: FAMILY MEDICINE CLINIC | Facility: CLINIC | Age: 63
End: 2023-05-30

## 2023-05-30 NOTE — TELEPHONE ENCOUNTER
Allyson Floyd,      Please sign off on form if you agree to: Disability due to increased stress/panic attacks: 5/11/23 - 9/4/23  (Please place your signature on the first page only)    -From your Inbasket, Highlight the patient and click Chart   -Double click the 4/2/61 Forms Completion telephone encounter  -Carmelo Terrazas down to the Media section   -Click the blue Hyperlink: Brody Mann 8/71/18   -Click Acknowledge located at the bottom right corner (if you do not see acknowledge, try maximizing your window)   -Drag the mouse into the blank space of the document and a + sign will appear. Left click to   electronically sign the document.      Thank you,    Mihai Box

## 2023-05-31 ENCOUNTER — TELEPHONE (OUTPATIENT)
Dept: FAMILY MEDICINE CLINIC | Facility: CLINIC | Age: 63
End: 2023-05-31

## 2023-05-31 NOTE — TELEPHONE ENCOUNTER
Sent request for medical information from [facility] to [address] [place] [address] [address] fax# [phone].  Confirmation rec'd

## 2023-08-19 ENCOUNTER — PATIENT MESSAGE (OUTPATIENT)
Dept: FAMILY MEDICINE CLINIC | Facility: CLINIC | Age: 63
End: 2023-08-19

## 2023-08-20 NOTE — TELEPHONE ENCOUNTER
No protocol for requested medication. Please advise on refill request.  Pended for signature. Refill Protocol Appointment Criteria  Appointment scheduled in the past 6 months or in the next 3 months  Recent Outpatient Visits              3 months ago Panic attacks    Colton Sapp APRN    Office Visit    6 months ago Administrative encounter    [address], Virtual Visit Mark Landin PA-C    E-Visit    6 months ago COVID-19    [address], [address], Sandrine Lopez MD    Office Visit    6 months ago Administrative encounter    [address], Virtual Visit DENI Stapleton    E-Visit    6 months ago [address], [facility]    E-Visit          Future Appointments         Provider Department Appt Notes    In 1 month MD Gilberto Kimbrough [facility] Lab results and RX refills.

## 2023-08-20 NOTE — TELEPHONE ENCOUNTER
From: Graciela Link  To: Samantha Baxter MD  Sent: 8/19/2023 2:26 PM CDT  Subject: Refill request     Please send refill for Vyvanse 30mg. Will need refill before Wednesday 8/23/23.      Thanks,  Avi Mitchell

## 2023-09-20 ENCOUNTER — PATIENT MESSAGE (OUTPATIENT)
Dept: FAMILY MEDICINE CLINIC | Facility: CLINIC | Age: 63
End: 2023-09-20

## 2023-09-20 DIAGNOSIS — E55.9 VITAMIN D DEFICIENCY: ICD-10-CM

## 2023-09-20 DIAGNOSIS — Z12.11 SCREEN FOR COLON CANCER: ICD-10-CM

## 2023-09-20 DIAGNOSIS — Z12.31 SCREENING MAMMOGRAM, ENCOUNTER FOR: Primary | ICD-10-CM

## 2023-09-20 DIAGNOSIS — Z00.00 ANNUAL PHYSICAL EXAM: ICD-10-CM

## 2023-09-20 NOTE — TELEPHONE ENCOUNTER
Patient with upcoming appointment requesting lab orders to be placed to have completed prior to upcoming visit:    Future Appointments   Date Time Provider Ludwin Proctor   9/21/2023 10:00 AM Ayush Martinez [facility]   10/3/2023 10:30 AM Lindsey Augustin MD [facility]

## 2023-09-20 NOTE — TELEPHONE ENCOUNTER
From: Lauren Travis  To: Nabeel [facility]  Sent: 9/20/2023 1:10 PM CDT  Subject: Fasting lab orders     Please order appropriate fasting labs to be drawn a few days prior to annual visit on 10/3/23.      Thank you

## 2023-09-29 ENCOUNTER — HOSPITAL ENCOUNTER (OUTPATIENT)
Dept: MAMMOGRAPHY | Age: 63
Discharge: HOME | End: 2023-09-29
Attending: FAMILY MEDICINE
Payer: COMMERCIAL

## 2023-09-29 ENCOUNTER — LAB ENCOUNTER (OUTPATIENT)
Dept: LAB | Age: 63
End: 2023-09-29
Attending: FAMILY MEDICINE
Payer: COMMERCIAL

## 2023-09-29 DIAGNOSIS — Z12.31 SCREENING MAMMOGRAM, ENCOUNTER FOR: ICD-10-CM

## 2023-09-29 DIAGNOSIS — E55.9 VITAMIN D DEFICIENCY: ICD-10-CM

## 2023-09-29 DIAGNOSIS — Z00.00 ANNUAL PHYSICAL EXAM: ICD-10-CM

## 2023-09-29 LAB
ALBUMIN SERPL-MCNC: 3.7 G/DL (ref 3.4–5)
ALBUMIN/GLOB SERPL: 1.1 {RATIO} (ref 1–2)
ALP LIVER SERPL-CCNC: 119 U/L
ALT SERPL-CCNC: 74 U/L
ANION GAP SERPL CALC-SCNC: 4 MMOL/L (ref 0–18)
AST SERPL-CCNC: 50 U/L (ref 15–37)
BASOPHILS # BLD AUTO: 0.09 X10(3) UL (ref 0–0.2)
BASOPHILS NFR BLD AUTO: 1.4 %
BILIRUB SERPL-MCNC: 0.7 MG/DL (ref 0.1–2)
BUN BLD-MCNC: 17 MG/DL (ref 7–18)
CALCIUM BLD-MCNC: 9.2 MG/DL (ref 8.5–10.1)
CHLORIDE SERPL-SCNC: 109 MMOL/L (ref 98–112)
CHOLEST SERPL-MCNC: 154 MG/DL (ref ?–200)
CO2 SERPL-SCNC: 29 MMOL/L (ref 21–32)
CREAT BLD-MCNC: 0.71 MG/DL
EGFRCR SERPLBLD CKD-EPI 2021: 95 ML/MIN/1.73M2 (ref 60–?)
EOSINOPHIL # BLD AUTO: 0.16 X10(3) UL (ref 0–0.7)
EOSINOPHIL NFR BLD AUTO: 2.4 %
ERYTHROCYTE [DISTWIDTH] IN BLOOD BY AUTOMATED COUNT: 12.8 %
FASTING PATIENT LIPID ANSWER: YES
FASTING STATUS PATIENT QL REPORTED: YES
GLOBULIN PLAS-MCNC: 3.4 G/DL (ref 2.8–4.4)
GLUCOSE BLD-MCNC: 85 MG/DL (ref 70–99)
HCT VFR BLD AUTO: 42.7 %
HDLC SERPL-MCNC: 61 MG/DL (ref 40–59)
HGB BLD-MCNC: 13.7 G/DL
IMM GRANULOCYTES # BLD AUTO: 0.03 X10(3) UL (ref 0–1)
IMM GRANULOCYTES NFR BLD: 0.5 %
LDLC SERPL CALC-MCNC: 70 MG/DL (ref ?–100)
LYMPHOCYTES # BLD AUTO: 1.81 X10(3) UL (ref 1–4)
LYMPHOCYTES NFR BLD AUTO: 27.5 %
MCH RBC QN AUTO: 29.9 PG (ref 26–34)
MCHC RBC AUTO-ENTMCNC: 32.1 G/DL (ref 31–37)
MCV RBC AUTO: 93.2 FL
MONOCYTES # BLD AUTO: 0.57 X10(3) UL (ref 0.1–1)
MONOCYTES NFR BLD AUTO: 8.7 %
NEUTROPHILS # BLD AUTO: 3.92 X10 (3) UL (ref 1.5–7.7)
NEUTROPHILS # BLD AUTO: 3.92 X10(3) UL (ref 1.5–7.7)
NEUTROPHILS NFR BLD AUTO: 59.5 %
NONHDLC SERPL-MCNC: 93 MG/DL (ref ?–130)
OSMOLALITY SERPL CALC.SUM OF ELEC: 295 MOSM/KG (ref 275–295)
PLATELET # BLD AUTO: 350 10(3)UL (ref 150–450)
POTASSIUM SERPL-SCNC: 3.7 MMOL/L (ref 3.5–5.1)
PROT SERPL-MCNC: 7.1 G/DL (ref 6.4–8.2)
RBC # BLD AUTO: 4.58 X10(6)UL
SODIUM SERPL-SCNC: 142 MMOL/L (ref 136–145)
TRIGL SERPL-MCNC: 134 MG/DL (ref 30–149)
TSI SER-ACNC: 1.19 MIU/ML (ref 0.36–3.74)
VIT B12 SERPL-MCNC: 477 PG/ML (ref 193–986)
VIT D+METAB SERPL-MCNC: 61.2 NG/ML (ref 30–100)
VLDLC SERPL CALC-MCNC: 20 MG/DL (ref 0–30)
WBC # BLD AUTO: 6.6 X10(3) UL (ref 4–11)

## 2023-09-29 PROCEDURE — 80053 COMPREHEN METABOLIC PANEL: CPT

## 2023-09-29 PROCEDURE — 77067 SCR MAMMO BI INCL CAD: CPT | Performed by: FAMILY MEDICINE

## 2023-09-29 PROCEDURE — 80061 LIPID PANEL: CPT

## 2023-09-29 PROCEDURE — 82607 VITAMIN B-12: CPT

## 2023-09-29 PROCEDURE — 82306 VITAMIN D 25 HYDROXY: CPT

## 2023-09-29 PROCEDURE — 77063 BREAST TOMOSYNTHESIS BI: CPT | Performed by: FAMILY MEDICINE

## 2023-09-29 PROCEDURE — 85025 COMPLETE CBC W/AUTO DIFF WBC: CPT

## 2023-09-29 PROCEDURE — 84443 ASSAY THYROID STIM HORMONE: CPT

## 2023-09-29 PROCEDURE — 36415 COLL VENOUS BLD VENIPUNCTURE: CPT

## 2023-10-03 ENCOUNTER — OFFICE VISIT (OUTPATIENT)
Dept: FAMILY MEDICINE CLINIC | Facility: CLINIC | Age: 63
End: 2023-10-03

## 2023-10-03 VITALS
HEIGHT: 67 IN | HEART RATE: 84 BPM | DIASTOLIC BLOOD PRESSURE: 78 MMHG | WEIGHT: 179.38 LBS | BODY MASS INDEX: 28.16 KG/M2 | SYSTOLIC BLOOD PRESSURE: 111 MMHG

## 2023-10-03 DIAGNOSIS — R74.8 ELEVATED LIVER ENZYMES: ICD-10-CM

## 2023-10-03 DIAGNOSIS — E78.00 HYPERCHOLESTEREMIA: ICD-10-CM

## 2023-10-03 DIAGNOSIS — E03.9 ACQUIRED HYPOTHYROIDISM: ICD-10-CM

## 2023-10-03 DIAGNOSIS — R63.2 BINGE EATING: Primary | ICD-10-CM

## 2023-10-03 DIAGNOSIS — J30.2 SEASONAL ALLERGIC RHINITIS, UNSPECIFIED TRIGGER: ICD-10-CM

## 2023-10-03 DIAGNOSIS — F41.9 ANXIETY: ICD-10-CM

## 2023-10-03 PROCEDURE — 3008F BODY MASS INDEX DOCD: CPT | Performed by: FAMILY MEDICINE

## 2023-10-03 PROCEDURE — 90686 IIV4 VACC NO PRSV 0.5 ML IM: CPT | Performed by: FAMILY MEDICINE

## 2023-10-03 PROCEDURE — 3078F DIAST BP <80 MM HG: CPT | Performed by: FAMILY MEDICINE

## 2023-10-03 PROCEDURE — 90471 IMMUNIZATION ADMIN: CPT | Performed by: FAMILY MEDICINE

## 2023-10-03 PROCEDURE — 3074F SYST BP LT 130 MM HG: CPT | Performed by: FAMILY MEDICINE

## 2023-10-03 PROCEDURE — 99214 OFFICE O/P EST MOD 30 MIN: CPT | Performed by: FAMILY MEDICINE

## 2023-10-03 RX ORDER — MONTELUKAST SODIUM 10 MG/1
10 TABLET ORAL DAILY
Qty: 90 TABLET | Refills: 3 | Status: SHIPPED | OUTPATIENT
Start: 2023-10-03 | End: 2024-09-27

## 2023-10-20 ENCOUNTER — LAB ENCOUNTER (OUTPATIENT)
Dept: LAB | Age: 63
End: 2023-10-20
Attending: FAMILY MEDICINE

## 2023-10-20 DIAGNOSIS — R74.8 ELEVATED LIVER ENZYMES: ICD-10-CM

## 2023-10-20 LAB
ALBUMIN SERPL-MCNC: 3.8 G/DL (ref 3.4–5)
ALP LIVER SERPL-CCNC: 114 U/L
ALT SERPL-CCNC: 71 U/L
AST SERPL-CCNC: 45 U/L (ref 15–37)
BILIRUB DIRECT SERPL-MCNC: 0.2 MG/DL (ref 0–0.2)
BILIRUB SERPL-MCNC: 0.7 MG/DL (ref 0.1–2)
HAV AB SER QL IA: NONREACTIVE
HBV CORE AB SERPL QL IA: NONREACTIVE
HBV SURFACE AB SER QL: REACTIVE
HBV SURFACE AB SERPL IA-ACNC: 19.11 MIU/ML
HBV SURFACE AG SERPL QL IA: NONREACTIVE
HCV AB SERPL QL IA: NONREACTIVE
PROT SERPL-MCNC: 7.6 G/DL (ref 6.4–8.2)

## 2023-10-20 PROCEDURE — 87340 HEPATITIS B SURFACE AG IA: CPT

## 2023-10-20 PROCEDURE — 86803 HEPATITIS C AB TEST: CPT

## 2023-10-20 PROCEDURE — 80503 PATH CLIN CONSLTJ SF 5-20: CPT

## 2023-10-20 PROCEDURE — 86708 HEPATITIS A ANTIBODY: CPT

## 2023-10-20 PROCEDURE — 86706 HEP B SURFACE ANTIBODY: CPT

## 2023-10-20 PROCEDURE — 86704 HEP B CORE ANTIBODY TOTAL: CPT

## 2023-10-20 PROCEDURE — 80076 HEPATIC FUNCTION PANEL: CPT

## 2023-10-20 PROCEDURE — 36415 COLL VENOUS BLD VENIPUNCTURE: CPT

## 2023-10-21 DIAGNOSIS — R74.8 ELEVATED LIVER ENZYMES: Primary | ICD-10-CM

## 2023-10-21 NOTE — PROGRESS NOTES
Liver enzymes are still a bit elevated.  I would like you to go for liver ultrasound to check for fatty liver disease. - Dr. Estee Cespedes
wheelchair

## 2023-11-07 ENCOUNTER — HOSPITAL ENCOUNTER (OUTPATIENT)
Dept: ULTRASOUND IMAGING | Age: 63
Discharge: HOME | End: 2023-11-07
Attending: FAMILY MEDICINE
Payer: COMMERCIAL

## 2023-11-07 DIAGNOSIS — R74.8 ELEVATED LIVER ENZYMES: ICD-10-CM

## 2023-11-07 PROCEDURE — 76705 ECHO EXAM OF ABDOMEN: CPT | Performed by: FAMILY MEDICINE

## 2023-11-07 NOTE — PROGRESS NOTES
Ultrasound does confirm fatty liver disease and benign - simple liver cysts.  Really focus on healthy low fat, low processed food diet - Mediterranean diet and exercise 5 times a week for 30 minutes. - Dr. Santa Desai

## 2023-11-21 NOTE — TELEPHONE ENCOUNTER
Please review; protocol failed.    Requested Prescriptions   Pending Prescriptions Disp Refills    VYVANSE 30 MG Oral Cap [Pharmacy Med Name: Vyvanse 30 Mg Cap Gloria] 30 capsule 0     Sig: TAKE ONE CAPSULE BY MOUTH ONE TIME DAILY       There is no refill protocol information for this order        Recent Outpatient Visits              1 month ago Binge eating    Lorena Martinez, Salima Zambrano MD    Office Visit    6 months ago Panic attacks    [address] [facility], [place] DENI Purcell    Office Visit    9 months ago Administrative encounter    [facility], Virtual Visit Mike Hoover PA-C    E-Visit    9 months ago COVID-19    Lorena Martinez, Salima Zambrano MD    Office Visit    9 months ago Administrative encounter    [place] [facility], Virtual Visit DENI Sy    E-Visit

## 2023-11-24 RX ORDER — LISDEXAMFETAMINE DIMESYLATE CAPSULES 30 MG/1
30 CAPSULE ORAL DAILY
Qty: 30 CAPSULE | Refills: 0 | Status: SHIPPED | OUTPATIENT
Start: 2023-11-24

## 2023-12-20 RX ORDER — MONTELUKAST SODIUM 10 MG/1
10 TABLET ORAL DAILY
Qty: 90 TABLET | Refills: 3 | Status: SHIPPED | OUTPATIENT
Start: 2023-12-20 | End: 2024-12-14

## 2023-12-20 RX ORDER — ATORVASTATIN CALCIUM 40 MG/1
40 TABLET, FILM COATED ORAL NIGHTLY
Qty: 90 TABLET | Refills: 3 | Status: SHIPPED | OUTPATIENT
Start: 2023-12-20

## 2023-12-20 RX ORDER — LEVOTHYROXINE SODIUM 112 UG/1
112 TABLET ORAL
Qty: 90 TABLET | Refills: 3 | Status: SHIPPED | OUTPATIENT
Start: 2023-12-20

## 2023-12-20 RX ORDER — LISDEXAMFETAMINE DIMESYLATE CAPSULES 30 MG/1
30 CAPSULE ORAL DAILY
Qty: 30 CAPSULE | Refills: 0 | Status: SHIPPED | OUTPATIENT
Start: 2023-12-20

## 2023-12-20 NOTE — TELEPHONE ENCOUNTER
Failed Protocol/No Protocol. Requested Prescriptions   Pending Prescriptions Disp Refills    lisdexamfetamine (VYVANSE) 30 MG Oral Cap 30 capsule 0     Sig: Take 1 capsule (30 mg total) by mouth daily. There is no refill protocol information for this order      Signed Prescriptions Disp Refills    atorvastatin 40 MG Oral Tab 90 tablet 3     Sig: Take 1 tablet (40 mg total) by mouth nightly.        Cholesterol Medication Protocol Passed - 12/19/2023 11:10 AM        Passed - ALT in past 12 months        Passed - LDL in past 12 months        Passed - Last ALT < 80     Lab Results   Component Value Date    ALT 71 (H) 10/20/2023             Passed - Last LDL < 130     Lab Results   Component Value Date    LDL 70 09/29/2023             Passed - In person appointment or virtual visit in the past 12 mos or appointment in next 3 mos     Recent Outpatient Visits              2 months ago Binge eating    Inocencio Enriquez MD    Office Visit    7 months ago Panic attacks    Colton Enriquez APRN    Office Visit    10 months ago Administrative encounter    [address], Virtual Visit Nazia Cedeño PA-C    E-Visit    10 months ago COVID-19    Inocencio Enriquez MD    Office Visit    10 months ago Administrative encounter    [facility], Virtual Visit Serafina Primrose, APRN    E-Visit                        levothyroxine 112 MCG Oral Tab 90 tablet 3     Sig: Take 1 tablet (112 mcg total) by mouth every morning before breakfast.       Thyroid Medication Protocol Passed - 12/19/2023 11:10 AM        Passed - TSH in past 12 months        Passed - Last TSH value is normal     Lab Results   Component Value Date    TSH 1.190 09/29/2023                 Passed - In person appointment or virtual visit in the past 12 mos or appointment in next 3 mos     Recent Outpatient Visits              2 months ago Binge eating    Tita Chao MD    Office Visit    7 months ago Panic attacks    Carlos Gallagher, DENI Arreaga    Office Visit    10 months ago Administrative encounter    [facility], Virtual Visit Selvin Mcgraw PA-C    E-Visit    10 months ago COVID-19    Gonzalez Jean-Pierre Gallagher MD    Office Visit    10 months ago [facility]    E-Visit                        montelukast (SINGULAIR) 10 MG Oral Tab 90 tablet 3     Sig: Take 1 tablet (10 mg total) by mouth daily.        Asthma & COPD Medication Protocol Passed - 12/19/2023 11:10 AM        Passed - In person appointment or virtual visit in the past 6 mos or appointment in next 3 mos     Recent Outpatient Visits              2 months ago Binge eating    Carlos Gallagher, Jean-Pierre Villela MD    Office Visit    7 months ago Panic attacks    Carlos Gallagher, DENI Arreaga    Office Visit    10 months ago Administrative encounter    [address], Virtual Visit Sevlin Mcgraw PA-C    E-Visit    10 months ago COVID-19    [address], [address], Jean-Pierre Villela MD    Office Visit    10 months ago Administrative encounter    [address], Virtual Visit DENI Nelson    E-Visit                             Recent Outpatient Visits              2 months ago Binge eating    Carlos Gallagher, Jean-Pierre Villela MD    Office Visit    7 months ago Panic attacks    [facility], [address], DENI Arreaga    Office Visit    10 months ago Administrative encounter    [facility], Virtual Visit Eulalia Correa PA-C    E-Visit    10 months ago COVID-19    [address] [facility], Sandrita Rosales MD    Office Visit    10 months ago Administrative encounter    [address], Virtual Visit DENI Collins    E-Visit

## 2023-12-20 NOTE — TELEPHONE ENCOUNTER
Refill Passed per Protocol. Requested Prescriptions   Pending Prescriptions Disp Refills    atorvastatin 40 MG Oral Tab 90 tablet 3     Sig: Take 1 tablet (40 mg total) by mouth nightly.        Cholesterol Medication Protocol Passed - 12/19/2023 11:10 AM        Passed - ALT in past 12 months        Passed - LDL in past 12 months        Passed - Last ALT < 80     Lab Results   Component Value Date    ALT 71 (H) 10/20/2023             Passed - Last LDL < 130     Lab Results   Component Value Date    LDL 70 09/29/2023             Passed - In person appointment or virtual visit in the past 12 mos or appointment in next 3 mos     Recent Outpatient Visits              2 months ago Binge eating    [address] [place], [place] [address]Juan MD    Office Visit    7 months ago Panic attacks    [address] [place], [place] [address]Colton APRN    Office Visit    10 months ago Administrative encounter    [facility], Virtual Visit Radha Lowery PA-C    E-Visit    10 months ago COVID-19    [address] [place], [place] [address]Juan MD    Office Visit    10 months ago Administrative encounter    [facility], Virtual Visit DENI Delgado    E-Visit                        levothyroxine 112 MCG Oral Tab 90 tablet 3     Sig: Take 1 tablet (112 mcg total) by mouth every morning before breakfast.       Thyroid Medication Protocol Passed - 12/19/2023 11:10 AM        Passed - TSH in past 12 months        Passed - Last TSH value is normal     Lab Results   Component Value Date    TSH 1.190 09/29/2023                 Passed - In person appointment or virtual visit in the past 12 mos or appointment in next 3 mos     Recent Outpatient Visits              2 months ago Binge eating    [address] [place], [place] [address]Juan MD    Office Visit    7 months ago Panic attacks Colton Blanton APRN    Office Visit    10 months ago Administrative encounter    [facility], Virtual Visit Lucas Downing PA-C    E-Visit    10 months ago COVID-19    Kala Blanton MD    Office Visit    10 months ago Administrative encounter    [facility], Virtual Visit DENI Tomas    E-Visit                        montelukast (SINGULAIR) 10 MG Oral Tab 90 tablet 3     Sig: Take 1 tablet (10 mg total) by mouth daily. Asthma & COPD Medication Protocol Passed - 12/19/2023 11:10 AM        Passed - In person appointment or virtual visit in the past 6 mos or appointment in next 3 mos     Recent Outpatient Visits              2 months ago Binge eating    Kala Blanton MD    Office Visit    7 months ago Panic attacks    Colton Blanton APRN    Office Visit    10 months ago Administrative encounter    [address], Virtual Visit Lucas Downing PA-C    E-Visit    10 months ago COVID-19    Kala Blanton MD    Office Visit    10 months ago Administrative encounter    [facility], Virtual Visit DENI Tomas    E-Visit                        lisdexamfetamine (VYVANSE) 30 MG Oral Cap 30 capsule 0     Sig: Take 1 capsule (30 mg total) by mouth daily.        There is no refill protocol information for this order             Recent Outpatient Visits              2 months ago Binge eating    Kala Blanton MD    Office Visit    7 months ago Panic attacks    Colton Blanton APRN    Office Visit    10 months ago Administrative encounter [facility], Virtual Visit Hussein Hendrix PA-C    E-Visit    10 months ago COVID-19    [address] [facility], Jessie Eagle MD    Office Visit    10 months ago Administrative encounter    [address], Virtual Visit Saint Pavy, DENI    E-Visit

## 2024-01-01 ENCOUNTER — PATIENT MESSAGE (OUTPATIENT)
Dept: FAMILY MEDICINE CLINIC | Facility: CLINIC | Age: 64
End: 2024-01-01

## 2024-01-01 DIAGNOSIS — M25.562 PAIN IN BOTH KNEES, UNSPECIFIED CHRONICITY: Primary | ICD-10-CM

## 2024-01-01 DIAGNOSIS — M25.561 PAIN IN BOTH KNEES, UNSPECIFIED CHRONICITY: Primary | ICD-10-CM

## 2024-01-02 NOTE — TELEPHONE ENCOUNTER
Patient with xray bilateral knees 2021 with moderate OA. Ok for referral to ortho. Orders signed. Thanks!

## 2024-01-02 NOTE — TELEPHONE ENCOUNTER
From: Lindsey Travis  To: Caterina Peck  Sent: 1/1/2024 11:32 PM CST  Subject: Orthopedic referral request    Please generate referral with insurance approval for Dr Laura Martins ([place] location). Reason for referral- bilateral knee pain. Appointment has been scheduled for Monday- January 8, 2024 @ 140pm Number of office visits requested is 6    Any questions please don’t hesitate to contact me.     Lindsey Travis

## 2024-01-08 ENCOUNTER — OFFICE VISIT (OUTPATIENT)
Dept: ORTHOPEDICS CLINIC | Facility: CLINIC | Age: 64
End: 2024-01-08
Payer: COMMERCIAL

## 2024-01-08 ENCOUNTER — HOSPITAL ENCOUNTER (OUTPATIENT)
Dept: GENERAL RADIOLOGY | Age: 64
Discharge: HOME | End: 2024-01-08
Attending: ORTHOPAEDIC SURGERY
Payer: COMMERCIAL

## 2024-01-08 ENCOUNTER — TELEPHONE (OUTPATIENT)
Dept: ORTHOPEDICS CLINIC | Facility: CLINIC | Age: 64
End: 2024-01-08

## 2024-01-08 VITALS — WEIGHT: 179 LBS | BODY MASS INDEX: 28.09 KG/M2 | HEIGHT: 67 IN

## 2024-01-08 DIAGNOSIS — G89.29 CHRONIC PAIN OF BOTH KNEES: ICD-10-CM

## 2024-01-08 DIAGNOSIS — M25.561 CHRONIC PAIN OF BOTH KNEES: ICD-10-CM

## 2024-01-08 DIAGNOSIS — G89.29 CHRONIC PAIN OF BOTH KNEES: Primary | ICD-10-CM

## 2024-01-08 DIAGNOSIS — M25.561 CHRONIC PAIN OF BOTH KNEES: Primary | ICD-10-CM

## 2024-01-08 DIAGNOSIS — M25.562 CHRONIC PAIN OF BOTH KNEES: ICD-10-CM

## 2024-01-08 DIAGNOSIS — M25.562 CHRONIC PAIN OF BOTH KNEES: Primary | ICD-10-CM

## 2024-01-08 DIAGNOSIS — M17.0 PRIMARY OSTEOARTHRITIS OF BOTH KNEES: Primary | ICD-10-CM

## 2024-01-08 PROCEDURE — 73564 X-RAY EXAM KNEE 4 OR MORE: CPT | Performed by: ORTHOPAEDIC SURGERY

## 2024-01-08 RX ORDER — ACETAMINOPHEN 500 MG
500 TABLET ORAL EVERY 6 HOURS PRN
COMMUNITY

## 2024-01-08 RX ORDER — TRIAMCINOLONE ACETONIDE 40 MG/ML
40 INJECTION, SUSPENSION INTRA-ARTICULAR; INTRAMUSCULAR ONCE
Status: SHIPPED | OUTPATIENT
Start: 2024-01-08

## 2024-01-08 NOTE — PROGRESS NOTES
[facility] New Consult    Chief Complaint   Patient presents with    Knee Pain     JOSETTE KNEE PAIN; EQUALLY PAINFUL; ONSET: 15 YEARS +         HPI: The patient is a 63 year old female referred for orthopaedic consultation by DENI Saldaña with complaints of chronic bilateral knee pain which has worsened over the years.  She is a retired labor and delivery RN, bikes up to 10 miles at a time, enjoys landscaping and neighborhood walks.  Symptoms from knee osteoarthritis have limited these activities of late particularly at the right knee where she suspects there is some moderate swelling.  Treatments in the past have included Synvisc 1 which provided fairly significant of benefit .  Cortisone has also provided relief in the past.    Past Medical History:   Diagnosis Date    Anxiety     Bulging disc     Disorder of thyroid     Domestic concerns      prostate cancer --seeds implanted     Floaters     Left wrist fracture     Casted, surgery with pins     Other and unspecified hyperlipidemia     Pregnancy      X 2    Primary hypothyroidism     Radioactive iodine therapy    Status post biopsy of thyroid gland     Benign thyroid needle bx     Thyroid disease 2000    medication     Past Surgical History:   Procedure Laterality Date      , 1989    X 2    COLONOSCOPY      ELECTROCARDIOGRAM, COMPLETE  2012    Scanned to media tab 2012    TONSILLECTOMY  1972    WRIST FRACTURE SURGERY Left 2013    Surgery with pins     Current Outpatient Medications   Medication Sig Dispense Refill    acetaminophen 500 MG Oral Tab Take 1 tablet (500 mg total) by mouth every 6 (six) hours as needed for Pain.      atorvastatin 40 MG Oral Tab Take 1 tablet (40 mg total) by mouth nightly. 90 tablet 3    levothyroxine 112 MCG Oral Tab Take 1 tablet (112 mcg total) by mouth every morning before breakfast. 90 tablet 3    montelukast (SINGULAIR) 10 MG Oral Tab Take 1 tablet  (10 mg total) by mouth daily. 90 tablet 3    lisdexamfetamine (VYVANSE) 30 MG Oral Cap Take 1 capsule (30 mg total) by mouth daily. 30 capsule 0    Beclomethasone Diprop HFA 80 MCG/ACT Inhalation Aerosol, Breath Activated Inhale 2 puffs into the lungs daily. 10 g 4    sertraline 50 MG Oral Tab Take 2 tablets for a total of 100mg 90 tablet 3    lisdexamfetamine (VYVANSE) 50 MG Oral Cap       albuterol 108 (90 Base) MCG/ACT Inhalation Aero Soln Inhale 2 puffs into the lungs every 4 (four) hours as needed for Wheezing. 1 each 1    Cholecalciferol (VITAMIN D) 50 MCG (2000 UT) Oral Cap Take 2.5 capsules (5,000 Units total) by mouth.      albuterol 108 (90 Base) MCG/ACT Inhalation Aero Soln Inhale 2 puffs into the lungs every 6 (six) hours as needed for Wheezing. 18 g 2    Multiple Vitamin (MULTIVITAMINS OR) Take 500 tablets by mouth.      ibuprofen 600 MG Oral Tab Take 200 mg by mouth every 6 (six) hours as needed for Pain. (Patient not taking: Reported on 10/3/2023)       Allergies   Allergen Reactions    Diphteria And Tetanus PALPITATIONS    Penicillin G Procaine ANAPHYLAXIS    Tetanus-Diphtheria Toxoids Td PALPITATIONS     Family History   Problem Relation Age of Onset    Heart Disease Father         CAD    Heart Disorder Father 45        Cardiac stents    Other (Other) Father     Other (liver cancer) Father     Crohn's Disease Mother          at age 60 (cause of death)    No Known Problems Son     No Known Problems Son     Other (Other) Maternal Grandmother     Other (Other) Maternal Grandfather     Other (Other) Paternal Grandmother     Other (Other) Paternal Grandfather     Thyroid disease Other         close relative    Heart Disease Other         Close relative     Social History     Occupational History    Not on file   Tobacco Use    Smoking status: Never    Smokeless tobacco: Never   Vaping Use    Vaping Use: Never used   Substance and Sexual Activity    Alcohol use: Not Currently    Drug use: No    Sexual  activity: Not on file        ROS:  Complete ROS reviewed by me and non-contributory to the chief complaint except as mentioned above.    Physical Exam:    Ht 5' 7\" (1.702 m)   Wt 179 lb (81.2 kg)   LMP  (LMP Unknown)   BMI 28.04 kg/m²   Constitutional: Well developed, well nourished 63 year old female  Psychological: NAD, alert and appropriate  Respiratory: Breathing comfortably on room air with RR of 10-14  Cardiac: Palpable distal pulses with pink warm extremities distally  Knees: Inspection reveals intact overlying skin without obvious discoloration but mild varus deformity.  Significant patellofemoral crepitus is palpable through adequate arc of motion 0-125 bilaterally.  Mild patellofemoral and moderate tibiofemoral joint line tenderness is noted bilaterally, worse on the right than the left.  No significant intra-articular effusion is present bilaterally.  Ligaments are stable on gentle varus valgus stress with good endpoints.  Lachman and posterior drawer are both negative bilaterally.  Neurovascular status is intact on sensory, motor and perfusion assessment distally.    Imaging:     XR KNEE, COMPLETE (4 OR MORE VIEWS), LEFT (CPT=73564)    Result Date: 1/8/2024  CONCLUSION:  1. Advanced osteoarthritis left knee.    Dictated by (CST): Cole Hudson MD on 1/08/2024 at 4:31 PM     Finalized by (CST): Cole Hudson MD on 1/08/2024 at 4:31 PM          XR KNEE, COMPLETE (4 OR MORE VIEWS), RIGHT (CPT=73564)    Result Date: 1/8/2024  CONCLUSION:  1. Advanced osteoarthritis right knee.    Dictated by (CST): Cole Hudson MD on 1/08/2024 at 4:29 PM     Finalized by (CST): Cole Hudson MD on 1/08/2024 at 4:30 PM           Assessment/Diagnoses:  Diagnoses and all orders for this visit:    Primary osteoarthritis of both knees  -     DRAIN/INJECT LARGE JOINT/BURSA  -     triamcinolone acetonide (Kenalog-40) 40 MG/ML injection 40 mg  -     DRAIN/INJECT LARGE JOINT/BURSA  -     triamcinolone acetonide (Kenalog-40)  40 MG/ML injection 40 mg        Plan:  I reviewed imaging and exam findings with the patient.  The diagnosis is degenerative joint disease of the knees.  We discussed the etiology, natural history and treatment options in detail.  Treatments include activity modification, weight loss, anti-inflammatory use and possible injections.  In the long term, the patient's symptoms may progress and warrant consideration of total knee arthroplasty, but only if symptoms become severe.  For now, non-surgical treatment options are recommended.  We discussed the option for knee corticosteroid injection along with the potential risks, benefits and alternatives.  In light of progressive symptoms, the patient elects to proceed and gives written consent.  All questions were answered and the patient verbalized understanding and appreciation.  If symptoms are not improving over the next 2 to 4 weeks, I asked the patient to contact our office for possible next steps.    Knee Cortisone Injection Procedure:  After discussing risks, benefits and alternatives to cortisone injection including but not limited to needle infection, steroid flare, transient elevated blood sugars in diabetics or failed improvement, the patient gave written consent to proceed.  Using meticulous sterile technique, and after attempted aspiration, I injected 40 mg of Kenalog mixed with 4 cc of 1% Xylocaine at the lateral patellofemoral joint of the right and left knees in sequence to minimal resistance.  The patient tolerated this well and a Band-Aid was applied to each knee.  Instructions were given to contact us if the patient experiences any adverse effects.      Laura Martins MD, [facility]  Sports Medicine/Knee and Shoulder  [facility]  Phone [phone]  Fax [phone]      This document was partially prepared using Dragon Medical voice recognition software.  Although every attempt is made to correct errors during dictation, discrepancies  may still exist.

## 2024-01-08 NOTE — TELEPHONE ENCOUNTER
XR ordered per ortho protocol. XR scheduled and patient was notified via [email or web address] to let them know that they should arrive 15-20 minutes early, in order for them to complete imaging.

## 2024-01-30 DIAGNOSIS — R63.2 BINGE EATING: ICD-10-CM

## 2024-01-30 DIAGNOSIS — E66.3 OVERWEIGHT (BMI 25.0-29.9): Primary | ICD-10-CM

## 2024-01-31 NOTE — TELEPHONE ENCOUNTER
Please review; protocol failed/ No protocol     Requested Prescriptions   Pending Prescriptions Disp Refills    lisdexamfetamine (VYVANSE) 30 MG Oral Cap 30 capsule 0     Sig: Take 1 capsule (30 mg total) by mouth daily.       There is no refill protocol information for this order          Recent Outpatient Visits              3 weeks ago Primary osteoarthritis of both knees    [facility], [place] Choi, Kellen, MD    Office Visit    4 months ago [facility]    [facility]    8 months ago [facility]    Office Visit    11 months ago Administrative encounter    [facility], Virtual Visit Megan Anderson PA-C    E-Visit    11 months ago COVID-19    [facility], Caterina Ron MD    Office Visit

## 2024-02-01 RX ORDER — LISDEXAMFETAMINE DIMESYLATE CAPSULES 30 MG/1
30 CAPSULE ORAL DAILY
Qty: 30 CAPSULE | Refills: 0 | Status: SHIPPED | OUTPATIENT
Start: 2024-02-01 | End: 2024-02-01

## 2024-02-02 RX ORDER — LISDEXAMFETAMINE DIMESYLATE CAPSULES 30 MG/1
30 CAPSULE ORAL DAILY
Qty: 30 CAPSULE | Refills: 0 | Status: SHIPPED | OUTPATIENT
Start: 2024-02-02

## 2024-02-23 ENCOUNTER — PATIENT MESSAGE (OUTPATIENT)
Dept: FAMILY MEDICINE CLINIC | Facility: CLINIC | Age: 64
End: 2024-02-23

## 2024-02-23 NOTE — TELEPHONE ENCOUNTER
[facility], please assist.     From: Lindsey Travis  To: Caterina Peck  Sent: 2/23/2024  3:32 PM CST  Subject: International medication letter    Good afternoon Dr Peck.  I’m traveling to [place] 3/15 - 3/21.  [employer] is asking for letter written and signed indicating my current RX, dosage and instructions for the RXs.      Please mail the letter to my home address as they are requiring original only.      Thanks in advance, please feel free to contact me if you need further information.     Lindsey

## 2024-04-03 ENCOUNTER — TELEPHONE (OUTPATIENT)
Dept: ORTHOPEDICS CLINIC | Facility: CLINIC | Age: 64
End: 2024-04-03

## 2024-04-03 DIAGNOSIS — M17.0 PRIMARY OSTEOARTHRITIS OF BOTH KNEES: Primary | ICD-10-CM

## 2024-04-04 ENCOUNTER — TELEPHONE (OUTPATIENT)
Dept: ORTHOPEDICS CLINIC | Facility: CLINIC | Age: 64
End: 2024-04-04

## 2024-04-04 NOTE — TELEPHONE ENCOUNTER
Patient is requesting to start synvisc gel injection process. Please review/revise pending order.  Thank you!    LOV 1/8/24  Bilateral steroid injections

## 2024-04-15 NOTE — TELEPHONE ENCOUNTER
Patient has appointment scheduled already for 5/6/24 in [facility] with .    Medication sent to [facility] for appointment

## 2024-04-22 NOTE — TELEPHONE ENCOUNTER
Medication is at [facility] for appt   Ordered botox     The following medication has been approved and sent to your pharmacy    Requested Prescriptions     Signed Prescriptions Disp Refills    onabotulinumtoxinA (BOTOX) 100 units injection 2 each 3     Sig: Inject 200 Units into the muscle every 3 months     Authorizing Provider: ABDIEL FISH

## 2024-05-06 ENCOUNTER — OFFICE VISIT (OUTPATIENT)
Dept: ORTHOPEDICS CLINIC | Facility: CLINIC | Age: 64
End: 2024-05-06
Payer: COMMERCIAL

## 2024-05-06 VITALS — HEIGHT: 67 IN | WEIGHT: 179 LBS | BODY MASS INDEX: 28.09 KG/M2

## 2024-05-06 DIAGNOSIS — M17.0 PRIMARY OSTEOARTHRITIS OF BOTH KNEES: Primary | ICD-10-CM

## 2024-05-06 DIAGNOSIS — R63.2 BINGE EATING: ICD-10-CM

## 2024-05-06 DIAGNOSIS — E66.3 OVERWEIGHT (BMI 25.0-29.9): ICD-10-CM

## 2024-05-06 NOTE — PROGRESS NOTES
[facility]    Chief Complaint: Recurrent bilateral knee pain    HPI: The patient is a 63 year old female returning for orthopedic consultation due to chronic recurrent bilateral knee pain.  She was last seen in our office in early January and treated with corticosteroid injections to both knees for osteoarthritis.  This provided temporary relief but no more than a month.  She continues to remain active including lawnmowing chores at home.  There is a remote history of Synvisc 1 injections to the knees in  provided by an outside orthopedist which reportedly lasted greater than a year.  No adverse reactions were reported at that time other than some mild swelling and soreness more so on the right than the left.  She recalls the procedure being somewhat painful.    Past Medical History:    Anxiety    Bulging disc    Disorder of thyroid    Domestic concerns     prostate cancer --seeds implanted     Floaters    Left wrist fracture    Casted, surgery with pins     Other and unspecified hyperlipidemia    Pregnancy (HCC)     X 2    Primary hypothyroidism    Radioactive iodine therapy    Status post biopsy of thyroid gland    Benign thyroid needle bx     Thyroid disease    medication     Past Surgical History:   Procedure Laterality Date      , 1989    X 2    Colonoscopy      Electrocardiogram, complete  2012    Scanned to media tab 2012    Tonsillectomy  1972    Wrist fracture surgery Left 2013    Surgery with pins     Current Outpatient Medications   Medication Sig Dispense Refill    lisdexamfetamine (VYVANSE) 30 MG Oral Cap Take 1 capsule (30 mg total) by mouth daily. 30 capsule 0    acetaminophen 500 MG Oral Tab Take 1 tablet (500 mg total) by mouth every 6 (six) hours as needed for Pain.      atorvastatin 40 MG Oral Tab Take 1 tablet (40 mg total) by mouth nightly. 90 tablet 3    levothyroxine 112 MCG Oral Tab Take 1 tablet (112 mcg total) by  mouth every morning before breakfast. 90 tablet 3    montelukast (SINGULAIR) 10 MG Oral Tab Take 1 tablet (10 mg total) by mouth daily. 90 tablet 3    Beclomethasone Diprop HFA 80 MCG/ACT Inhalation Aerosol, Breath Activated Inhale 2 puffs into the lungs daily. 10 g 4    sertraline 50 MG Oral Tab Take 2 tablets for a total of 100mg 90 tablet 3    albuterol 108 (90 Base) MCG/ACT Inhalation Aero Soln Inhale 2 puffs into the lungs every 4 (four) hours as needed for Wheezing. 1 each 1    Cholecalciferol (VITAMIN D) 50 MCG (2000 UT) Oral Cap Take 2.5 capsules (5,000 Units total) by mouth.      Multiple Vitamin (MULTIVITAMINS OR) Take 500 tablets by mouth.       Allergies   Allergen Reactions    Diphteria And Tetanus PALPITATIONS    Penicillin G Procaine ANAPHYLAXIS    Tetanus-Diphtheria Toxoids Td PALPITATIONS     Family History   Problem Relation Age of Onset    Heart Disease Father         CAD    Heart Disorder Father 45        Cardiac stents    Other (Other) Father     Other (liver cancer) Father     Crohn's Disease Mother          at age 60 (cause of death)    No Known Problems Son     No Known Problems Son     Other (Other) Maternal Grandmother     Other (Other) Maternal Grandfather     Other (Other) Paternal Grandmother     Other (Other) Paternal Grandfather     Thyroid disease Other         close relative    Heart Disease Other         Close relative     Social History     Occupational History    Not on file   Tobacco Use    Smoking status: Never    Smokeless tobacco: Never   Vaping Use    Vaping status: Never Used   Substance and Sexual Activity    Alcohol use: Not Currently    Drug use: No    Sexual activity: Not on file        ROS:  Complete ROS reviewed by me and non-contributory to the chief complaint except as mentioned above.    Physical Exam:    Ht 5' 7\" (1.702 m)   Wt 179 lb (81.2 kg)   LMP  (LMP Unknown)   BMI 28.04 kg/m²   Constitutional: Well developed, well nourished 63 year old  female  Psychological: NAD, alert and appropriate  Respiratory: Breathing comfortably on room air with RR of 10-14  Cardiac: Palpable distal pulses with pink warm extremities distally  Examination of the knees reveals neutral to trace varus alignment.  There is no palpable effusion or warmth bilaterally.  Extensor mechanism is nontender to palpation at the insertions.  Intermittent click and crepitus is noted at the patellofemoral joint through an arc of motion estimated at 0-120 degrees bilaterally.  Medial joint line is tender with moderate pain on Stu's and Naveen's tests.  Ligaments are stable on varus valgus stress with no pain and solid endpoints.  Lachman and posterior drawer are negative.  Neurovascular status is intact on sensory, motor and perfusion assessment distally.      Imaging: Multiple views of the right and left knees on 824 personally viewed, demonstrating advanced osteoarthritic changes at the right and left knees.      Assessment/Diagnoses:  Diagnoses and all orders for this visit:    Primary osteoarthritis of both knees  -     DRAIN/INJECT LARGE JOINT/BURSA  -     hylan G-F 20 (Synvisc-One) 48 MG/6ML intra-articular injection 48 mg  -     DRAIN/INJECT LARGE JOINT/BURSA  -     hylan G-F 20 (Synvisc-One) 48 MG/6ML intra-articular injection 48 mg        Plan:  I reviewed imaging and exam findings with the patient.  The diagnosis is degenerative joint disease of the knee.  We discussed the etiology, natural history and treatment options in detail.  Treatments include activity modification, weight loss, anti-inflammatory use and possible injections.  In the long term, the patient may become a candidate for total knee arthroplasty, but only if symptoms become severe despite exhaustive non-operative care.  For now, non-surgical treatment options are recommended.  We discussed the option for repeat knee corticosteroid injection along with the potential benefits, alternatives and associated  risks.  Given only brief benefit from recent administration, we also discussed the option for intra-articular viscosupplementation alternatives.  Synvisc 1 was well-tolerated in the past with good results.  Risk, benefits and alternatives to visco supplement injection were reviewed including but not limited to needle infection, hypersensitivity reaction or failed improvement.  Unlike cortisone, benefit from viscosupplementation may take up to 4 weeks before noticeable improvement.  The patient expressed understanding and a desire to proceed.  She was advised not to engage in any vigorous exercise or lawnmowing over the next couple of weeks.  All questions were answered and the patient verbalized understanding and appreciation.     BilateralVisco supplement Injection Procedure:  After discussing the risk benefits and alternatives to visco supplement injection including but not limited to needle infection, hypersensitivity reaction or failed improvement, the patient gave written and verbal consent to proceed.  Using meticulous sterile technique I injected 4 cc of 1% Xylocaine at the lateral patellofemoral joint of the right and left knees in sequence for local anesthesia.  After attempted aspiration, I injected the entire contents of the Synvisc 1 syringe through an 18-gauge needle to the right and left knees in sequence to minimal resistance.  The patient tolerated this well, a Band-Aid was applied to each knee, and instructions were given to contact us with any adverse reactions.        Laura Martins MD, [facility]  Orthopaedic Surgery   Sports Medicine/Knee and Shoulder  [facility]  t: [phone]  f: [phone]               This document was partially prepared using Dragon Medical voice recognition software.  Although every attempt is made to correct errors during dictation, discrepancies may still exist.

## 2024-05-08 NOTE — TELEPHONE ENCOUNTER
Please review.  Protocol failed / No protocol.    Requested Prescriptions   Pending Prescriptions Disp Refills    lisdexamfetamine (VYVANSE) 30 MG Oral Cap 30 capsule 0     Sig: Take 1 capsule (30 mg total) by mouth daily.       Controlled Substance Medication Failed - 5/6/2024  3:32 PM        Failed - This medication is a controlled substance - forward to provider to refill                 Recent Outpatient Visits              2 days ago Primary osteoarthritis of both knees    [facility], [place], Kellen, MD    Office Visit    4 months ago Primary osteoarthritis of both knees    [facility], [place], Kellen, MD    Office Visit    7 months ago Binge eating    [facility], Caterina Ron MD    Office Visit    1 year ago Panic attacks    [facility], Vaishnavi Morris APRN    Office Visit    1 year ago Administrative encounter    [facility], Virtual Visit Megan Anderson PA-C    E-Visit

## 2024-05-09 RX ORDER — LISDEXAMFETAMINE DIMESYLATE 30 MG/1
30 CAPSULE ORAL DAILY
Qty: 30 CAPSULE | Refills: 0 | Status: SHIPPED | OUTPATIENT
Start: 2024-05-09

## 2024-06-10 DIAGNOSIS — E66.3 OVERWEIGHT (BMI 25.0-29.9): ICD-10-CM

## 2024-06-10 DIAGNOSIS — R63.2 BINGE EATING: ICD-10-CM

## 2024-06-13 NOTE — TELEPHONE ENCOUNTER
Please Review. Protocol Failed; No Protocol     Recent fills: Quantity: 30  05/09/2024 02/02/2024 12/21/2023                                                                    Patient is due: 06/09/2024  Last Rx written: 05/09/2024  Last Office Visit: 10/03/2023        Requested Prescriptions   Pending Prescriptions Disp Refills    lisdexamfetamine (VYVANSE) 30 MG Oral Cap 30 capsule 0     Sig: Take 1 capsule (30 mg total) by mouth daily.       Controlled Substance Medication Failed - 6/10/2024  6:31 PM        Failed - This medication is a controlled substance - forward to provider to refill                 Recent Outpatient Visits              1 month ago Primary osteoarthritis of both knees    [facility], [place], Kellen, MD    Office Visit    5 months ago Primary osteoarthritis of both knees    [facility], [place], Kellen, MD    Office Visit    8 months ago Binge eating    [facility], Caterina Ron MD    Office Visit    1 year ago Panic attacks    [facility], Vaishnavi Morris APRN    Office Visit    1 year ago Administrative encounter    [facility], Virtual Visit Megan Anderson PA-C    E-Visit

## 2024-06-14 DIAGNOSIS — E66.3 OVERWEIGHT (BMI 25.0-29.9): ICD-10-CM

## 2024-06-14 DIAGNOSIS — R63.2 BINGE EATING: ICD-10-CM

## 2024-06-15 RX ORDER — LISDEXAMFETAMINE DIMESYLATE 30 MG/1
30 CAPSULE ORAL DAILY
Qty: 30 CAPSULE | Refills: 0 | Status: SHIPPED | OUTPATIENT
Start: 2024-06-15

## 2024-06-17 RX ORDER — LISDEXAMFETAMINE DIMESYLATE 30 MG/1
30 CAPSULE ORAL DAILY
Qty: 30 CAPSULE | Refills: 0 | OUTPATIENT
Start: 2024-06-17

## 2024-06-25 ENCOUNTER — PATIENT MESSAGE (OUTPATIENT)
Dept: FAMILY MEDICINE CLINIC | Facility: CLINIC | Age: 64
End: 2024-06-25

## 2024-06-25 DIAGNOSIS — U07.1 COVID-19: ICD-10-CM

## 2024-06-26 NOTE — TELEPHONE ENCOUNTER
From: Lindsey Travis  To: Caterina Peck  Sent: 6/25/2024 2:25 PM CDT  Subject: Albuterol rescue inhaler     Please send refill for rescue inhaler. [place] location in chart. Thank you

## 2024-06-28 NOTE — TELEPHONE ENCOUNTER
Please Review. Protocol Failed; No Protocol   Recent Visits  Date Type Provider Dept   10/03/23 Office Visit Caterina Peck MD [facility]   05/08/23 Office Visit Vaishnavi Mann APRN [facility]   02/13/23 Office Visit Caterina Peck MD [facility]   Showing recent visits within past 540 days with a meds authorizing provider and meeting all other requirements  Future Appointments  No visits were found meeting these conditions.  Showing future appointments within next 150 days with a meds authorizing provider and meeting all other requirements    Requested Prescriptions   Pending Prescriptions Disp Refills    albuterol 108 (90 Base) MCG/ACT Inhalation Aero Soln 1 each 1     Sig: Inhale 2 puffs into the lungs every 4 (four) hours as needed for Wheezing.       Asthma & COPD Medication Protocol Failed - 6/25/2024  8:56 PM        Failed - Asthma Action Score greater than or equal to 20        Failed - Appointment in past 6 or next 3 months      Recent Outpatient Visits              1 month ago Primary osteoarthritis of both knees    [facility], [place], Kellen, MD    Office Visit    5 months ago Primary osteoarthritis of both knees    [facility], [place], Kellen, MD    Office Visit    8 months ago Binge eating    [facility] Caterina Peck MD    Office Visit    [facility]    [facility] Visit    1 year ago Administrative encounter    [facility], Virtual Visit Megan Anderson PA-C    E-Visit                      Failed - AAP/ACT given in last 12 months     No data recorded  No data recorded  No data recorded  No data recorded                   Recent Outpatient Visits              1 month ago Primary osteoarthritis of both knees    [facility],  [place], Kellen, MD    Office Visit    5 months ago Primary osteoarthritis of both knees    [facility], [place], Kellen, MD    Office Visit    8 months ago Binge eating    [facility] [address], Caterina Ron MD    Office Visit    1 year ago Panic attacks    [facility], Vaishnavi Morris APRN    Office Visit    1 year ago Administrative encounter    [facility], Virtual Visit Megan Anderson PA-C    E-Visit

## 2024-06-30 RX ORDER — ALBUTEROL SULFATE 90 UG/1
2 AEROSOL, METERED RESPIRATORY (INHALATION) EVERY 4 HOURS PRN
Qty: 54 G | Refills: 0 | Status: SHIPPED | OUTPATIENT
Start: 2024-06-30

## 2024-08-31 ENCOUNTER — PATIENT MESSAGE (OUTPATIENT)
Dept: FAMILY MEDICINE CLINIC | Facility: CLINIC | Age: 64
End: 2024-08-31

## 2024-08-31 DIAGNOSIS — E55.9 VITAMIN D DEFICIENCY: ICD-10-CM

## 2024-08-31 DIAGNOSIS — Z00.00 ANNUAL PHYSICAL EXAM: Primary | ICD-10-CM

## 2024-09-02 NOTE — TELEPHONE ENCOUNTER
Pended routine labs, Vit D and B 12.  Safend message sent for A1C request  ===patient has no hx diabetes and per labs hx ,it was never done before.   We will wait for the patient's reply .      LAST LABS were done on 9/29/24.    Future Appointments   Date Time Provider Department Center   9/9/2024 10:00 AM Laura Martins MD [facility] [place]   9/10/2024 11:00 AM Vaishnavi Mann APRN [facility]       From: Lindsey Travis  To: Caterina Peck  Sent: 8/31/2024 10:07 PM CDT  Subject: Fasting Labs    Please generate orders for fasting labs, including A1C and Vitamin D.   OV scheduled 9/10 with Vaishnavi ASHER to review labs and renew Adrian’s    Thanks, Lindsey

## 2024-09-04 NOTE — TELEPHONE ENCOUNTER
Dr Peck or Vaishnavi CHEEMA, patient requesting to do labs this Friday.  She wants an A1C as baseline.  Please advise on pended lab orders.      Future Appointments   Date Time Provider Department Center   9/9/2024 10:00 AM Laura Martins MD [facility] [place]   9/10/2024 11:00 AM Vaishnavi Mann APRN [facility]

## 2024-09-06 NOTE — TELEPHONE ENCOUNTER
No further action needed.     Future Appointments   Date Time Provider Department Center   9/9/2024 10:00 AM Laura Martins MD [facility] [place]   9/10/2024  2:45 PM [facility]

## 2024-09-09 ENCOUNTER — LAB ENCOUNTER (OUTPATIENT)
Dept: LAB | Age: 64
End: 2024-09-09
Attending: FAMILY MEDICINE
Payer: COMMERCIAL

## 2024-09-09 ENCOUNTER — OFFICE VISIT (OUTPATIENT)
Dept: ORTHOPEDICS CLINIC | Facility: CLINIC | Age: 64
End: 2024-09-09
Payer: COMMERCIAL

## 2024-09-09 ENCOUNTER — PATIENT MESSAGE (OUTPATIENT)
Dept: FAMILY MEDICINE CLINIC | Facility: CLINIC | Age: 64
End: 2024-09-09

## 2024-09-09 ENCOUNTER — TELEPHONE (OUTPATIENT)
Dept: FAMILY MEDICINE CLINIC | Facility: CLINIC | Age: 64
End: 2024-09-09

## 2024-09-09 VITALS — HEIGHT: 67 IN | WEIGHT: 179 LBS | BODY MASS INDEX: 28.09 KG/M2

## 2024-09-09 DIAGNOSIS — Z00.00 ANNUAL PHYSICAL EXAM: ICD-10-CM

## 2024-09-09 DIAGNOSIS — E55.9 VITAMIN D DEFICIENCY: Primary | ICD-10-CM

## 2024-09-09 DIAGNOSIS — E55.9 VITAMIN D DEFICIENCY: ICD-10-CM

## 2024-09-09 DIAGNOSIS — M17.0 PRIMARY OSTEOARTHRITIS OF BOTH KNEES: Primary | ICD-10-CM

## 2024-09-09 LAB
ALBUMIN SERPL-MCNC: 4.4 G/DL (ref 3.2–4.8)
ALBUMIN/GLOB SERPL: 1.5 {RATIO} (ref 1–2)
ALP LIVER SERPL-CCNC: 115 U/L
ALT SERPL-CCNC: 51 U/L
ANION GAP SERPL CALC-SCNC: 6 MMOL/L (ref 0–18)
AST SERPL-CCNC: 35 U/L (ref ?–34)
BASOPHILS # BLD AUTO: 0.08 X10(3) UL (ref 0–0.2)
BASOPHILS NFR BLD AUTO: 1 %
BILIRUB SERPL-MCNC: 1.2 MG/DL (ref 0.2–1.1)
BUN BLD-MCNC: 16 MG/DL (ref 9–23)
BUN/CREAT SERPL: 21.9 (ref 10–20)
CALCIUM BLD-MCNC: 9.8 MG/DL (ref 8.7–10.4)
CHLORIDE SERPL-SCNC: 107 MMOL/L (ref 98–112)
CHOLEST SERPL-MCNC: 173 MG/DL (ref ?–200)
CO2 SERPL-SCNC: 28 MMOL/L (ref 21–32)
CREAT BLD-MCNC: 0.73 MG/DL
DEPRECATED RDW RBC AUTO: 43.2 FL (ref 35.1–46.3)
EGFRCR SERPLBLD CKD-EPI 2021: 92 ML/MIN/1.73M2 (ref 60–?)
EOSINOPHIL # BLD AUTO: 0.11 X10(3) UL (ref 0–0.7)
EOSINOPHIL NFR BLD AUTO: 1.4 %
ERYTHROCYTE [DISTWIDTH] IN BLOOD BY AUTOMATED COUNT: 12.6 % (ref 11–15)
EST. AVERAGE GLUCOSE BLD GHB EST-MCNC: 108 MG/DL (ref 68–126)
FASTING PATIENT LIPID ANSWER: YES
FASTING STATUS PATIENT QL REPORTED: YES
GLOBULIN PLAS-MCNC: 2.9 G/DL (ref 2–3.5)
GLUCOSE BLD-MCNC: 80 MG/DL (ref 70–99)
HBA1C MFR BLD: 5.4 % (ref ?–5.7)
HCT VFR BLD AUTO: 40.9 %
HDLC SERPL-MCNC: 69 MG/DL (ref 40–59)
HGB BLD-MCNC: 13.5 G/DL
IMM GRANULOCYTES # BLD AUTO: 0.02 X10(3) UL (ref 0–1)
IMM GRANULOCYTES NFR BLD: 0.3 %
LDLC SERPL CALC-MCNC: 85 MG/DL (ref ?–100)
LYMPHOCYTES # BLD AUTO: 1.56 X10(3) UL (ref 1–4)
LYMPHOCYTES NFR BLD AUTO: 20.1 %
MCH RBC QN AUTO: 30.7 PG (ref 26–34)
MCHC RBC AUTO-ENTMCNC: 33 G/DL (ref 31–37)
MCV RBC AUTO: 93 FL
MONOCYTES # BLD AUTO: 0.63 X10(3) UL (ref 0.1–1)
MONOCYTES NFR BLD AUTO: 8.1 %
NEUTROPHILS # BLD AUTO: 5.38 X10 (3) UL (ref 1.5–7.7)
NEUTROPHILS # BLD AUTO: 5.38 X10(3) UL (ref 1.5–7.7)
NEUTROPHILS NFR BLD AUTO: 69.1 %
NONHDLC SERPL-MCNC: 104 MG/DL (ref ?–130)
OSMOLALITY SERPL CALC.SUM OF ELEC: 292 MOSM/KG (ref 275–295)
PLATELET # BLD AUTO: 355 10(3)UL (ref 150–450)
POTASSIUM SERPL-SCNC: 3.8 MMOL/L (ref 3.5–5.1)
PROT SERPL-MCNC: 7.3 G/DL (ref 5.7–8.2)
RBC # BLD AUTO: 4.4 X10(6)UL
SODIUM SERPL-SCNC: 141 MMOL/L (ref 136–145)
TRIGL SERPL-MCNC: 105 MG/DL (ref 30–149)
TSI SER-ACNC: 0.92 MIU/ML (ref 0.55–4.78)
VIT B12 SERPL-MCNC: 384 PG/ML (ref 211–911)
VIT D+METAB SERPL-MCNC: 66.4 NG/ML (ref 30–100)
VLDLC SERPL CALC-MCNC: 17 MG/DL (ref 0–30)
WBC # BLD AUTO: 7.8 X10(3) UL (ref 4–11)

## 2024-09-09 PROCEDURE — 36415 COLL VENOUS BLD VENIPUNCTURE: CPT

## 2024-09-09 PROCEDURE — 80053 COMPREHEN METABOLIC PANEL: CPT

## 2024-09-09 PROCEDURE — 82607 VITAMIN B-12: CPT

## 2024-09-09 PROCEDURE — 20610 DRAIN/INJ JOINT/BURSA W/O US: CPT | Performed by: ORTHOPAEDIC SURGERY

## 2024-09-09 PROCEDURE — 80061 LIPID PANEL: CPT

## 2024-09-09 PROCEDURE — 83036 HEMOGLOBIN GLYCOSYLATED A1C: CPT

## 2024-09-09 PROCEDURE — 85025 COMPLETE CBC W/AUTO DIFF WBC: CPT

## 2024-09-09 PROCEDURE — 99214 OFFICE O/P EST MOD 30 MIN: CPT | Performed by: ORTHOPAEDIC SURGERY

## 2024-09-09 PROCEDURE — 82306 VITAMIN D 25 HYDROXY: CPT

## 2024-09-09 PROCEDURE — 3008F BODY MASS INDEX DOCD: CPT | Performed by: ORTHOPAEDIC SURGERY

## 2024-09-09 PROCEDURE — 84443 ASSAY THYROID STIM HORMONE: CPT

## 2024-09-09 RX ORDER — TRIAMCINOLONE ACETONIDE 40 MG/ML
40 INJECTION, SUSPENSION INTRA-ARTICULAR; INTRAMUSCULAR ONCE
Status: COMPLETED | OUTPATIENT
Start: 2024-09-09 | End: 2024-09-09

## 2024-09-09 RX ADMIN — TRIAMCINOLONE ACETONIDE 40 MG: 40 INJECTION, SUSPENSION INTRA-ARTICULAR; INTRAMUSCULAR at 10:37:00

## 2024-09-09 NOTE — TELEPHONE ENCOUNTER
I received a call from  that patient was there to have blood work done but the orders in the system have .  I was able to reorder them. Patient has a Physical appointment with Dr. Peck for tomorrow.    Future Appointments   Date Time Provider Department Center   9/10/2024  2:45 PM Caterina Peck MD [facility]

## 2024-09-09 NOTE — PROGRESS NOTES
[facility] Note      Chief Complaint   Patient presents with    Knee Pain     BILATERAL KNEE PAIN  -COMING IN FOR BILATERAL CORTISONE INJECTIONS     HPI: The patient is a 63 year old female returning for orthopaedic consultation due to recurrent bilateral knee pain.  She underwent bilateral viscosupplementation with Synvisc 1 in May of this year.  She feels that the benefit from this injection was not as effective as previous.  She and her  are now retired and preparing for a trip to [place] followed by [place].  Her symptoms are bothersome enough that she is hopeful for symptomatic relief before her sightseeing travels.  She inquires as to whether cortisone injection would be beneficial.  No new catching, locking, obvious swelling or knee instability is reported.  She does not depend on any assistive devices.  She is not interested in discussing any surgical intervention at this time.    Past Medical History:    Anxiety    Bulging disc    Disorder of thyroid    Domestic concerns     prostate cancer --seeds implanted     Floaters    Left wrist fracture    Casted, surgery with pins     Other and unspecified hyperlipidemia    Pregnancy (HCC)     X 2    Primary hypothyroidism    Radioactive iodine therapy    Status post biopsy of thyroid gland    Benign thyroid needle bx     Thyroid disease    medication     Past Surgical History:   Procedure Laterality Date      , 1989    X 2    Colonoscopy      Electrocardiogram, complete  2012    Scanned to media tab 2012    Tonsillectomy  1972    Wrist fracture surgery Left 2013    Surgery with pins     Current Outpatient Medications   Medication Sig Dispense Refill    albuterol 108 (90 Base) MCG/ACT Inhalation Aero Soln Inhale 2 puffs into the lungs every 4 (four) hours as needed for Wheezing. 54 g 0    acetaminophen 500 MG Oral Tab Take 1 tablet (500 mg total) by mouth every 6 (six) hours  as needed for Pain.      atorvastatin 40 MG Oral Tab Take 1 tablet (40 mg total) by mouth nightly. 90 tablet 3    levothyroxine 112 MCG Oral Tab Take 1 tablet (112 mcg total) by mouth every morning before breakfast. 90 tablet 3    montelukast (SINGULAIR) 10 MG Oral Tab Take 1 tablet (10 mg total) by mouth daily. 90 tablet 3    sertraline 50 MG Oral Tab Take 2 tablets for a total of 100mg 90 tablet 3    Cholecalciferol (VITAMIN D) 50 MCG (2000 UT) Oral Cap Take 2.5 capsules (5,000 Units total) by mouth.      Multiple Vitamin (MULTIVITAMINS OR) Take 500 tablets by mouth.      lisdexamfetamine (VYVANSE) 30 MG Oral Cap Take 1 capsule (30 mg total) by mouth daily. 30 capsule 0    Beclomethasone Diprop HFA 80 MCG/ACT Inhalation Aerosol, Breath Activated Inhale 2 puffs into the lungs daily. 10 g 4     Allergies   Allergen Reactions    Diphteria And Tetanus PALPITATIONS    Penicillin G Procaine ANAPHYLAXIS    Tetanus-Diphtheria Toxoids Td PALPITATIONS     Family History   Problem Relation Age of Onset    Heart Disease Father         CAD    Heart Disorder Father 45        Cardiac stents    Other (Other) Father     Other (liver cancer) Father     Crohn's Disease Mother          at age 60 (cause of death)    No Known Problems Son     No Known Problems Son     Other (Other) Maternal Grandmother     Other (Other) Maternal Grandfather     Other (Other) Paternal Grandmother     Other (Other) Paternal Grandfather     Thyroid disease Other         close relative    Heart Disease Other         Close relative     Social History     Occupational History    Not on file   Tobacco Use    Smoking status: Never    Smokeless tobacco: Never   Vaping Use    Vaping status: Never Used   Substance and Sexual Activity    Alcohol use: Not Currently    Drug use: No    Sexual activity: Not on file        ROS:  Complete ROS reviewed by me and non-contributory to the chief complaint except as mentioned above.    Physical Exam:    Ht 5' 7\" (1.702 m)    Wt 179 lb (81.2 kg)   LMP  (LMP Unknown)   BMI 28.04 kg/m²   Constitutional: Well developed, well nourished 63 year old female  Psychological: NAD, alert and appropriate  Respiratory: Breathing comfortably on room air with RR of 10-14  Cardiac: Palpable distal pulses with pink warm extremities distally  Knees: Inspection reveals intact overlying skin without obvious discoloration but mild varus deformity.  Significant patellofemoral click and mild crepitus is palpable through adequate arc of motion.  Mild patellofemoral and moderate medial tibiofemoral joint line tenderness is noted bilaterally.  No significant effusion is present bilaterally.  Ligaments are stable on gentle varus valgus stress with good endpoints.  Lachman and posterior drawer are both negative bilaterally.  Neurovascular status is intact on sensory, motor and perfusion assessment distally.    Imaging:     No results found.      Multiple views right and left knees personally viewed, demonstrating bilateral osteoarthritic changes with joint space narrowing, but no acute bony abnormalities.      Assessment/Diagnoses:  Diagnoses and all orders for this visit:    Primary osteoarthritis of both knees  -     DRAIN/INJECT LARGE JOINT/BURSA  -     triamcinolone acetonide (Kenalog-40) 40 MG/ML injection 40 mg  -     DRAIN/INJECT LARGE JOINT/BURSA  -     triamcinolone acetonide (Kenalog-40) 40 MG/ML injection 40 mg      Plan:  I reviewed imaging and exam findings with the patient.  The diagnosis is recurrent bilateral knee pain from degenerative joint disease of the knees.  Unfortunately, viscosupplementation this past spring did not result in the lasting benefit that the patient was anticipating.  Now that she and her  are retired and preparing for extensive traveling, she is concerned about her recurrent symptoms limiting her activity levels including sightseeing.  In the long term, the patient's symptoms may progress and warrant consideration  of total knee arthroplasty, but only if symptoms become severe.  For now, non-surgical treatment options are recommended.  We discussed the option for knee corticosteroid injection along with the potential risks, benefits and alternatives.  In light of recurrent symptoms, the patient elects to proceed and gives written consent.  All questions were answered and the patient verbalized understanding and appreciation.  If symptoms are not improving over the next 2 to 4 weeks, I asked the patient to contact our office for possible next steps.    Knee Cortisone Injection Procedure:  After discussing risks, benefits and alternatives to cortisone injection including but not limited to needle infection, steroid flare, transient elevated blood sugars in diabetics or failed improvement, the patient gave written consent to proceed.  Using meticulous sterile technique, and after attempted aspiration, I injected 40 mg of Kenalog mixed with 4 cc of 1% Xylocaine at the lateral patellofemoral joint of the right and left knees in sequence to minimal resistance.  The patient tolerated this well and a Band-Aid was applied to each knee.  Instructions were given to contact us if the patient experiences any adverse effects.      Laura Martins MD, [facility]  Orthopaedic Surgery   Sports Medicine/Knee and Shoulder  [facility]  t: [phone]  f: [phone]               This document was partially prepared using Dragon Medical voice recognition software.  Although every attempt is made to correct errors during dictation, discrepancies may still exist.

## 2024-09-09 NOTE — TELEPHONE ENCOUNTER
See telephone encounter from today.     2024  Helen Handy RN         24 10:12 AM  Note     I received a call from  that patient was there to have blood work done but the orders in the system have .  I was able to reorder them. Patient has a Physical appointment with Dr. Peck for tomorrow.            Future Appointments   Date Time Provider Department Center   9/10/2024  2:45 PM Caterina Peck MD [facility]

## 2024-09-10 ENCOUNTER — OFFICE VISIT (OUTPATIENT)
Dept: FAMILY MEDICINE CLINIC | Facility: CLINIC | Age: 64
End: 2024-09-10
Payer: COMMERCIAL

## 2024-09-10 VITALS
BODY MASS INDEX: 29.13 KG/M2 | WEIGHT: 185.63 LBS | HEIGHT: 67 IN | SYSTOLIC BLOOD PRESSURE: 123 MMHG | HEART RATE: 95 BPM | DIASTOLIC BLOOD PRESSURE: 72 MMHG

## 2024-09-10 DIAGNOSIS — K76.0 FATTY LIVER DISEASE, NONALCOHOLIC: ICD-10-CM

## 2024-09-10 DIAGNOSIS — Z78.0 POST-MENOPAUSAL: ICD-10-CM

## 2024-09-10 DIAGNOSIS — M17.0 OSTEOARTHRITIS OF BOTH KNEES, UNSPECIFIED OSTEOARTHRITIS TYPE: ICD-10-CM

## 2024-09-10 DIAGNOSIS — E66.3 OVERWEIGHT (BMI 25.0-29.9): ICD-10-CM

## 2024-09-10 DIAGNOSIS — Z00.00 ANNUAL PHYSICAL EXAM: ICD-10-CM

## 2024-09-10 DIAGNOSIS — F41.9 ANXIETY: ICD-10-CM

## 2024-09-10 DIAGNOSIS — Z12.31 SCREENING MAMMOGRAM FOR BREAST CANCER: Primary | ICD-10-CM

## 2024-09-10 PROCEDURE — 3078F DIAST BP <80 MM HG: CPT | Performed by: FAMILY MEDICINE

## 2024-09-10 PROCEDURE — 99396 PREV VISIT EST AGE 40-64: CPT | Performed by: FAMILY MEDICINE

## 2024-09-10 PROCEDURE — 3008F BODY MASS INDEX DOCD: CPT | Performed by: FAMILY MEDICINE

## 2024-09-10 PROCEDURE — 3074F SYST BP LT 130 MM HG: CPT | Performed by: FAMILY MEDICINE

## 2024-09-10 RX ORDER — SERTRALINE HYDROCHLORIDE 100 MG/1
100 TABLET, FILM COATED ORAL DAILY
Qty: 90 TABLET | Refills: 4 | Status: SHIPPED | OUTPATIENT
Start: 2024-09-10

## 2024-09-10 RX ORDER — MONTELUKAST SODIUM 10 MG/1
10 TABLET ORAL DAILY
Qty: 90 TABLET | Refills: 4 | Status: SHIPPED | OUTPATIENT
Start: 2024-09-10

## 2024-09-10 RX ORDER — ALBUTEROL SULFATE 90 UG/1
2 AEROSOL, METERED RESPIRATORY (INHALATION) EVERY 4 HOURS PRN
Qty: 54 G | Refills: 4 | Status: SHIPPED | OUTPATIENT
Start: 2024-09-10

## 2024-09-10 RX ORDER — ATORVASTATIN CALCIUM 40 MG/1
40 TABLET, FILM COATED ORAL NIGHTLY
Qty: 90 TABLET | Refills: 4 | Status: SHIPPED | OUTPATIENT
Start: 2024-09-10

## 2024-09-10 RX ORDER — AZITHROMYCIN 250 MG/1
TABLET, FILM COATED ORAL
Qty: 6 TABLET | Refills: 0 | Status: SHIPPED | OUTPATIENT
Start: 2024-09-10 | End: 2024-09-14

## 2024-09-10 RX ORDER — LEVOTHYROXINE SODIUM 112 UG/1
112 TABLET ORAL
Qty: 90 TABLET | Refills: 4 | Status: SHIPPED | OUTPATIENT
Start: 2024-09-10

## 2024-09-10 NOTE — PROGRESS NOTES
Labs look good except continued slightly elevated liver enzymes. Will review at your appointment. - Dr. Peck

## 2024-09-10 NOTE — PROGRESS NOTES
HPI:   Lindsey Travis is a 63 year old female who presents for a complete physical exam.    Here for regular physical. Just had steroid injections for chronic knee pain. Plans to get covid and flu. Going to [place] next month.   Stopped vyvanse because getting anxious with it. Was using it for weight loss.   Would like script for antibiotics for travel.   Wt Readings from Last 3 Encounters:   09/10/24 185 lb 9.6 oz (84.2 kg)   24 179 lb (81.2 kg)   24 179 lb (81.2 kg)     Body mass index is 29.07 kg/m².       Current Outpatient Medications   Medication Sig Dispense Refill    albuterol 108 (90 Base) MCG/ACT Inhalation Aero Soln Inhale 2 puffs into the lungs every 4 (four) hours as needed for Wheezing. 54 g 0    acetaminophen 500 MG Oral Tab Take 1 tablet (500 mg total) by mouth every 6 (six) hours as needed for Pain.      atorvastatin 40 MG Oral Tab Take 1 tablet (40 mg total) by mouth nightly. 90 tablet 3    levothyroxine 112 MCG Oral Tab Take 1 tablet (112 mcg total) by mouth every morning before breakfast. 90 tablet 3    montelukast (SINGULAIR) 10 MG Oral Tab Take 1 tablet (10 mg total) by mouth daily. 90 tablet 3    sertraline 50 MG Oral Tab Take 2 tablets for a total of 100mg 90 tablet 3    Cholecalciferol (VITAMIN D) 50 MCG (2000 UT) Oral Cap Take 2.5 capsules (5,000 Units total) by mouth.      Multiple Vitamin (MULTIVITAMINS OR) Take 500 tablets by mouth.        Past Medical History:    Allergic rhinitis    Anxiety    Asthma (HCC)    Bulging disc    Depression    Disorder of thyroid    Domestic concerns     prostate cancer --seeds implanted -    Floaters    Left wrist fracture    Casted, surgery with pins 2013    Obesity    Osteoarthritis    Other and unspecified hyperlipidemia    Pregnancy (HCC)     X 2    Primary hypothyroidism    Radioactive iodine therapy    Status post biopsy of thyroid gland    Benign thyroid needle bx 2012    Thyroid disease    medication      Past Surgical  History:   Procedure Laterality Date      1986, 1989    X 2    Colonoscopy      Electrocardiogram, complete  2012    Scanned to media tab 2012    Tonsillectomy  1972    Wrist fracture surgery Left 2013    Surgery with pins      Family History   Problem Relation Age of Onset    Heart Disease Father         CAD    Heart Disorder Father 45        Extensive Cardiac History    Other (Other) Father     Other (liver cancer) Father     Cancer Father         Liver cancer    Crohn's Disease Mother          at age 60 (cause of death)    Hypertension Mother         Non compliant BP control    No Known Problems Son     No Known Problems Son     Other (Other) Maternal Grandmother     Other (Other) Maternal Grandfather     Other (Other) Paternal Grandmother     Other (Other) Paternal Grandfather     Thyroid disease Other         close relative    Heart Disease Other         Close relative      Social History:   Social History     Socioeconomic History    Marital status:    Tobacco Use    Smoking status: Never    Smokeless tobacco: Never   Vaping Use    Vaping status: Never Used   Substance and Sexual Activity    Alcohol use: Yes     Alcohol/week: 2.0 standard drinks of alcohol     Types: 2 Glasses of wine per week    Drug use: No   Other Topics Concern    Caffeine Concern Yes     Comment: Tea, 2 cups daily          REVIEW OF SYSTEMS:   GENERAL: feels well otherwise  Review of Systems   See HPI   EXAM:   /72   Pulse 95   Ht 5' 7\" (1.702 m)   Wt 185 lb 9.6 oz (84.2 kg)   LMP  (LMP Unknown)   BMI 29.07 kg/m²     GENERAL: well developed, well nourished,in no apparent distress  SKIN: no rashes,no suspicious lesions  HEENT: atraumatic, normocephalic,ears and throat are clear  EYES:PERRLA, EOMI, conjunctiva are clear  LUNGS: clear to auscultation  CARDIO: RRR without murmur  GI: good BS's,no masses, HSM or tenderness  EXTREMITIES: no cyanosis, clubbing or edema  NEURO: Oriented times  three,cranial nerves are intact,motor and sensory are grossly intact    ASSESSMENT AND PLAN:   Lindsey Travis is a 63 year old female who presents for a complete physical exam.    1. Screening mammogram for breast cancer    - [facility] REBECCA 2D+3D SCREENING BILAT (CPT=77067/48004); Future    2. Annual physical exam      3. Overweight (BMI 25.0-29.9)  Did not tolerate vyvanse. Would like to try wegovy if an option in the future. Does suffer from fatty liver disease and advanced OA of both knees.     4. Post-menopausal    - XR DEXA BONE DENSITOMETRY (CPT=77080); Future    5. Fatty liver disease, nonalcoholic  Continue to work on healthy diet - discussed following Mediterranean diet -using My Fitness pal to track calories. Also recommend Jump start your health - great program through the hospital.     6. Osteoarthritis of both knees, unspecified osteoarthritis type      7. Anxiety  Stable on sertraline.     Caterina Peck MD  9/10/2024  3:08 PM

## 2024-11-04 ENCOUNTER — TELEPHONE (OUTPATIENT)
Dept: ORTHOPEDICS CLINIC | Facility: CLINIC | Age: 64
End: 2024-11-04

## 2024-11-04 ENCOUNTER — PATIENT MESSAGE (OUTPATIENT)
Dept: ORTHOPEDICS CLINIC | Facility: CLINIC | Age: 64
End: 2024-11-04

## 2024-11-04 DIAGNOSIS — M17.0 PRIMARY OSTEOARTHRITIS OF BOTH KNEES: Primary | ICD-10-CM

## 2024-11-04 NOTE — TELEPHONE ENCOUNTER
Patient is coming in for bilateral knee pain and she wants her synvisc 1 - last injection was 5.6.24. Please order

## 2024-11-04 NOTE — TELEPHONE ENCOUNTER
Patient has upcoming appointment on 11/13 for bertrand gel injections, order has been pended for review    Last gel injections were done on 05/06/24  Last steroid injections were done on 09/09/24

## 2024-11-05 NOTE — TELEPHONE ENCOUNTER
Patient authorized visco to be scheduled:    DOS: 11/13/24  PROVIDER: YAQUELIN  MEDICATION: SYNVISC ONE- JOSETTE KNEE  OFFICE LOCATION: [facility]   PULLED: 11/06/24  LABELED: 11/06/24  PLACED: 11/06/24- POD 1 CABINET

## 2024-11-13 ENCOUNTER — OFFICE VISIT (OUTPATIENT)
Dept: ORTHOPEDICS CLINIC | Facility: CLINIC | Age: 64
End: 2024-11-13
Payer: MEDICARE

## 2024-11-13 VITALS — WEIGHT: 185 LBS | HEIGHT: 67 IN | BODY MASS INDEX: 29.03 KG/M2

## 2024-11-13 DIAGNOSIS — M17.0 PRIMARY OSTEOARTHRITIS OF BOTH KNEES: Primary | ICD-10-CM

## 2024-11-13 PROCEDURE — 20610 DRAIN/INJ JOINT/BURSA W/O US: CPT | Performed by: ORTHOPAEDIC SURGERY

## 2024-11-13 NOTE — PROGRESS NOTES
[facility] Note      Chief Complaint: Recurrent bilateral knee pain    HPI: The patient is a 64 year old female returning for orthopedic consultation due to chronic recurrent bilateral knee pain.  She recently returned from a  vacation where there was quite a bit of sightseeing and walking.  Her last treatment included a corticosteroid injection this summer prior to her departure.  This resulted in significant symptomatic relief but she is now having recurrent pain after travel.  Most of her pain is localized to the anterior medial aspect of the right knee without new swelling.  The left knee is more anterior and lateral.  No catching, locking, jean instability, warmth or redness is reported.  She is hopeful to undergo viscosupplement injections which she has tolerated in the past most recently in May.    Past Medical History:    Allergic rhinitis    Anxiety    Asthma (HCC)    Bulging disc    Depression    Disorder of thyroid    Domestic concerns     prostate cancer --seeds implanted     Floaters    Left wrist fracture    Casted, surgery with pins     Obesity    Osteoarthritis    Other and unspecified hyperlipidemia    Pregnancy (HCC)     X 2    Primary hypothyroidism    Radioactive iodine therapy    Status post biopsy of thyroid gland    Benign thyroid needle bx     Thyroid disease    medication     Past Surgical History:   Procedure Laterality Date      , 1989    X 2    Colonoscopy      Electrocardiogram, complete  2012    Scanned to media tab 2012    Tonsillectomy  1972    Wrist fracture surgery Left 2013    Surgery with pins     Current Outpatient Medications   Medication Sig Dispense Refill    atorvastatin 40 MG Oral Tab Take 1 tablet (40 mg total) by mouth nightly. 90 tablet 4    levothyroxine 112 MCG Oral Tab Take 1 tablet (112 mcg total) by mouth every morning before breakfast. 90 tablet 4    montelukast (SINGULAIR) 10  MG Oral Tab Take 1 tablet (10 mg total) by mouth daily. 90 tablet 4    sertraline 100 MG Oral Tab Take 1 tablet (100 mg total) by mouth daily. 90 tablet 4    albuterol 108 (90 Base) MCG/ACT Inhalation Aero Soln Inhale 2 puffs into the lungs every 4 (four) hours as needed for Wheezing. 54 g 4    acetaminophen 500 MG Oral Tab Take 1 tablet (500 mg total) by mouth every 6 (six) hours as needed for Pain.      Cholecalciferol (VITAMIN D) 50 MCG (2000 UT) Oral Cap Take 2.5 capsules (5,000 Units total) by mouth.      Multiple Vitamin (MULTIVITAMINS OR) Take 500 tablets by mouth.       Allergies   Allergen Reactions    Diphteria And Tetanus PALPITATIONS    Penicillin G Procaine ANAPHYLAXIS    Tetanus-Diphtheria Toxoids Td PALPITATIONS     Family History   Problem Relation Age of Onset    Heart Disease Father         CAD    Heart Disorder Father 45        Extensive Cardiac History    Other (Other) Father     Other (liver cancer) Father     Cancer Father         Liver cancer    Crohn's Disease Mother          at age 60 (cause of death)    Hypertension Mother         Non compliant BP control    No Known Problems Son     No Known Problems Son     Other (Other) Maternal Grandmother     Other (Other) Maternal Grandfather     Other (Other) Paternal Grandmother     Other (Other) Paternal Grandfather     Thyroid disease Other         close relative    Heart Disease Other         Close relative     Social History     Occupational History    Not on file   Tobacco Use    Smoking status: Never    Smokeless tobacco: Never   Vaping Use    Vaping status: Never Used   Substance and Sexual Activity    Alcohol use: Yes     Alcohol/week: 2.0 standard drinks of alcohol     Types: 2 Glasses of wine per week    Drug use: No    Sexual activity: Not on file        ROS:  Complete ROS reviewed by me and non-contributory to the chief complaint except as mentioned above.    Physical Exam:    Ht 5' 7\" (1.702 m)   Wt 185 lb (83.9 kg)   LMP  (LMP  Unknown)   BMI 28.98 kg/m²   Constitutional: Well developed, well nourished 64 year old female  Psychological: NAD, alert and appropriate  Respiratory: Breathing comfortably on room air with RR of 10-14  Cardiac: Palpable distal pulses with pink warm extremities distally  Examination of the knees reveals neutral to trace varus alignment.  There is no palpable effusion or warmth bilaterally.  Extensor mechanism is nontender to palpation at the insertions.  Intermittent click and crepitus is noted at the patellofemoral joint through an arc of motion estimated at 0-120 degrees bilaterally.  Medial joint line is tender with moderate pain on Stu's and Naveen's tests.  Ligaments are stable on varus valgus stress with no pain and solid endpoints.  Lachman and posterior drawer are negative.  Neurovascular status is intact on sensory, motor and perfusion assessment distally.    Imaging: Multiple views of the right and left knees on 1/8/24 personally viewed, demonstrating advanced osteoarthritic changes at the right and left knees primarily at the medial compartments and to a lesser degree at the patellofemoral joints.    Assessment/Diagnoses:  Diagnoses and all orders for this visit:    Primary osteoarthritis of both knees  -     hylan G-F 20 (Synvisc-One) 48 MG/6ML intra-articular injection 48 mg  -     hylan G-F 20 (Synvisc-One) 48 MG/6ML intra-articular injection 48 mg  -     DRAIN/INJECT LARGE JOINT/BURSA  -     DRAIN/INJECT LARGE JOINT/BURSA    Plan:  I reviewed imaging and exam findings with the patient.  She presents with a flareup of pain having returned from a fairly extensive vacation to [place].  The inclines, stairs and uneven surfaces were likely contributing.  No new effusion or acute injury is reported.  She is hopeful for symptomatic relief as she now prepares for an upcoming vacation back to [place].  Given recent corticosteroid injection this past summer, we discussed the option for repeat  intra-articular viscosupplementation.  Synvisc 1 was well-tolerated in May with good results.  Risk, benefits and alternatives to visco supplement injection were reviewed including but not limited to needle infection, hypersensitivity reaction or failed improvement.  Unlike cortisone, benefit from viscosupplementation may take up to 4 weeks before noticeable improvement.  The patient expressed understanding and a desire to proceed.  She was advised not to engage in any vigorous exercise leading up to her plans for travel.  All questions were answered and the patient verbalized understanding and appreciation.    Bilateral visco supplement Injection Procedure:  After discussing the risk benefits and alternatives to visco supplement injection including but not limited to needle infection, hypersensitivity reaction or failed improvement, the patient gave written and verbal consent to proceed.  Using meticulous sterile technique I injected 4 cc of 1% Xylocaine at the lateral patellofemoral joint of the right and left knees in sequence for local anesthesia.  After attempted aspiration, I injected the entire contents of the Synvisc 1 syringe through an 18-gauge needle to the right and left knees in sequence to minimal resistance.  The patient tolerated this well, a Band-Aid was applied to each knee, and instructions were given to contact us with any adverse reactions.    Laura Martins MD, [facility]  Orthopaedic Surgery   Sports Medicine/Knee and Shoulder  [facility]  t: [phone]  f: [phone]           This document was partially prepared using Dragon Medical voice recognition software.  Although every attempt is made to correct errors during dictation, discrepancies may still exist.

## 2024-11-15 ENCOUNTER — PATIENT MESSAGE (OUTPATIENT)
Dept: ORTHOPEDICS CLINIC | Facility: CLINIC | Age: 64
End: 2024-11-15

## 2025-01-27 ENCOUNTER — OFFICE VISIT (OUTPATIENT)
Dept: FAMILY MEDICINE CLINIC | Facility: CLINIC | Age: 65
End: 2025-01-27
Payer: MEDICARE

## 2025-01-27 VITALS
SYSTOLIC BLOOD PRESSURE: 128 MMHG | WEIGHT: 187 LBS | HEART RATE: 90 BPM | TEMPERATURE: 98 F | RESPIRATION RATE: 16 BRPM | OXYGEN SATURATION: 98 % | HEIGHT: 67 IN | DIASTOLIC BLOOD PRESSURE: 78 MMHG | BODY MASS INDEX: 29.35 KG/M2

## 2025-01-27 DIAGNOSIS — U07.1 COVID: Primary | ICD-10-CM

## 2025-01-27 LAB
OPERATOR ID: ABNORMAL
POCT LOT NUMBER: ABNORMAL
RAPID SARS-COV-2 BY PCR: DETECTED

## 2025-01-27 NOTE — PROGRESS NOTES
CHIEF COMPLAINT:     Chief Complaint   Patient presents with    Cough     Sx Friday - PND, dry cough, sinus pressure, frontal headache, bilat ear pressure, itchiness, body aches, chills  Sx yesterday - Dry cough, chest congestion  Sx today - Runny nose  Denies ST, ear pain, fever, nasal congestion, n/v/d, loss of appetite, SOB, rash  No Covid test was done at home  OTC Tylenol       HPI:   Lindsey Travis is a 64 year old female who presents for upper respiratory symptoms for  4 days. Patient reports sinus pressure, frontal headache, ear pressure and dry cough.  Symptoms have been persisting since onset.  Treating symptoms with Tylenol.   Denies fever, chest pain or sob.    with URI. No home testing.   Current Outpatient Medications   Medication Sig Dispense Refill    atorvastatin 40 MG Oral Tab Take 1 tablet (40 mg total) by mouth nightly. 90 tablet 4    levothyroxine 112 MCG Oral Tab Take 1 tablet (112 mcg total) by mouth every morning before breakfast. 90 tablet 4    montelukast (SINGULAIR) 10 MG Oral Tab Take 1 tablet (10 mg total) by mouth daily. 90 tablet 4    sertraline 100 MG Oral Tab Take 1 tablet (100 mg total) by mouth daily. 90 tablet 4    albuterol 108 (90 Base) MCG/ACT Inhalation Aero Soln Inhale 2 puffs into the lungs every 4 (four) hours as needed for Wheezing. 54 g 4    acetaminophen 500 MG Oral Tab Take 1 tablet (500 mg total) by mouth every 6 (six) hours as needed for Pain.      Cholecalciferol (VITAMIN D) 50 MCG (2000 UT) Oral Cap Take 2.5 capsules (5,000 Units total) by mouth.      Multiple Vitamin (MULTIVITAMINS OR) Take 500 tablets by mouth.        Past Medical History:    Allergic rhinitis    Anxiety    Asthma (HCC)    Bulging disc    Depression    Disorder of thyroid    Domestic concerns     prostate cancer --seeds implanted 7-09    Floaters    Left wrist fracture    Casted, surgery with pins 2013    Obesity    Osteoarthritis    Other and unspecified hyperlipidemia    Pregnancy  (HCC)     X 2    Primary hypothyroidism    Radioactive iodine therapy    Status post biopsy of thyroid gland    Benign thyroid needle bx 2012    Thyroid disease    medication      Past Surgical History:   Procedure Laterality Date      , 1989    X 2    Colonoscopy      Electrocardiogram, complete  2012    Scanned to media tab 2012    Tonsillectomy  1972    Wrist fracture surgery Left 2013    Surgery with pins         Social History     Socioeconomic History    Marital status:    Tobacco Use    Smoking status: Never    Smokeless tobacco: Never   Vaping Use    Vaping status: Never Used   Substance and Sexual Activity    Alcohol use: Yes     Alcohol/week: 2.0 standard drinks of alcohol     Types: 2 Glasses of wine per week    Drug use: No   Other Topics Concern    Caffeine Concern Yes     Comment: Tea, 2 cups daily         REVIEW OF SYSTEMS:   GENERAL: see HPI  SKIN: no rashes or abnormal skin lesions  HEENT: See HPI  LUNGS: denies shortness of breath or wheezing, See HPI  CARDIOVASCULAR: denies chest pain or palpitations   GI: denies N/V/C or abdominal pain      EXAM:   /78   Pulse 90   Temp 98.2 °F (36.8 °C) (Tympanic)   Resp 16   Ht 5' 7\" (1.702 m)   Wt 187 lb (84.8 kg)   LMP  (LMP Unknown)   SpO2 98%   BMI 29.29 kg/m²   GENERAL: well nourished,in no apparent distress  SKIN: no rashes,no suspicious lesions  HEAD: atraumatic, normocephalic.  + tenderness on palpation of frontal sinuses  EYES: conjunctiva clear, EOM intact  EARS: TM's gray, no bulging, no retraction,no fluid, bony landmarks intact  NOSE: Nostrils patent, no nasal discharge, nasal mucosa pink and swollen  THROAT: Oral mucosa pink, moist. Posterior pharynx is not erythematous.   NECK: Supple, non-tender  LUNGS: clear to auscultation bilaterally. Breathing is non labored. Dry cough noted.   CARDIO: RRR without murmur  LYMPH:  no cervical lymphadenopathy.        ASSESSMENT AND PLAN:   Lindsey WILLOUGHBY  Thaddeus is a 64 year old female who presents with     ASSESSMENT:   Encounter Diagnosis   Name Primary?    COVID Yes       PLAN:   Patient reports adverse effects with Paxlovid past infection. Declines today.   Comfort care as described in Patient Instructions  Discussed viral vs bacterial etiology of URIs. Patient was informed that antibiotics are not effective for treating viral ailments and can result in antibiotic resistance. Reviewed symptom relief measures with patient.   Follow up with PCP if no improvement in 4-5 days, seek care sooner if new symptoms.   The patient indicates understanding of these issues and agrees to the plan.

## 2025-01-28 ENCOUNTER — PATIENT MESSAGE (OUTPATIENT)
Dept: ORTHOPEDICS CLINIC | Facility: CLINIC | Age: 65
End: 2025-01-28

## 2025-01-30 NOTE — TELEPHONE ENCOUNTER
Per [employer] email regarding covid, reply sent to pt.  1/27/25 OV note reviewed from [facility]. Symptoms started 1/24/25. Tested positive 1/27/25.

## 2025-01-30 NOTE — TELEPHONE ENCOUNTER
Pt changed appt to 3/6/25 at [facility]. Messaged MA via [employer] to advise of change in location where pt will receive Synvisc One. Change acknowledged by Negra FARLEY.    Future Appointments   Date Time Provider Department Center   2/6/2025  2:00 PM Laura Martins MD [id number]

## 2025-02-19 ENCOUNTER — OFFICE VISIT (OUTPATIENT)
Dept: ORTHOPEDICS CLINIC | Facility: CLINIC | Age: 65
End: 2025-02-19
Payer: MEDICARE

## 2025-02-19 VITALS — WEIGHT: 187 LBS | BODY MASS INDEX: 29.35 KG/M2 | HEIGHT: 67 IN

## 2025-02-19 DIAGNOSIS — M17.0 PRIMARY OSTEOARTHRITIS OF BOTH KNEES: Primary | ICD-10-CM

## 2025-02-19 PROCEDURE — 99215 OFFICE O/P EST HI 40 MIN: CPT | Performed by: ORTHOPAEDIC SURGERY

## 2025-02-19 NOTE — PROGRESS NOTES
[facility] Orthopaedic Clinic Note      Chief Complaint   Patient presents with    Follow - Up     BILATERAL KNEE  -Had increased swelling in both knees after she received the gel injections last visit.  -Both knees hurt the same     HPI: The patient is a 64 year old female returning for orthopaedic reassessment of her chronically painful knees.  The left knee is more severe than the right with diminishing range of motion, 5 out of 10 baseline pain and poor tolerance of prolonged standing and walking.  Pain is reportedly progressed over the past year despite exhaustive nonoperative care.  This has included medications, injections and activity modification.  Her most recent injection of Synvisc to both knees did not have lasting benefit.  The patient's quality of life is increasingly affected due to the degree of pain which is now bothersome even at rest.  Peak pain is rated at 10 out of 10 although this is not frequent.  She is able to tolerate water aerobics and uses Tylenol or Advil as needed for symptomatic relief.  The patient would like to discuss definitive treatment options including knee arthroplasty.  The patient denies any recent injury or infection.    Past Medical History:    Allergic rhinitis    Anxiety    Asthma (HCC)    Bulging disc    Depression    Disorder of thyroid    Domestic concerns     prostate cancer --seeds implanted     Floaters    Left wrist fracture    Casted, surgery with pins     Obesity    Osteoarthritis    Other and unspecified hyperlipidemia    Pregnancy (HCC)     X 2    Primary hypothyroidism    Radioactive iodine therapy    Status post biopsy of thyroid gland    Benign thyroid needle bx     Thyroid disease    medication     Past Surgical History:   Procedure Laterality Date      , 1989    X 2    Colonoscopy      Electrocardiogram, complete  2012    Scanned to media tab 2012    Tonsillectomy  1972    Wrist fracture surgery  Left 2013    Surgery with pins     Current Outpatient Medications   Medication Sig Dispense Refill    atorvastatin 40 MG Oral Tab Take 1 tablet (40 mg total) by mouth nightly. 90 tablet 4    levothyroxine 112 MCG Oral Tab Take 1 tablet (112 mcg total) by mouth every morning before breakfast. 90 tablet 4    montelukast (SINGULAIR) 10 MG Oral Tab Take 1 tablet (10 mg total) by mouth daily. 90 tablet 4    sertraline 100 MG Oral Tab Take 1 tablet (100 mg total) by mouth daily. 90 tablet 4    albuterol 108 (90 Base) MCG/ACT Inhalation Aero Soln Inhale 2 puffs into the lungs every 4 (four) hours as needed for Wheezing. 54 g 4    acetaminophen 500 MG Oral Tab Take 1 tablet (500 mg total) by mouth every 6 (six) hours as needed for Pain.      Cholecalciferol (VITAMIN D) 50 MCG (2000 UT) Oral Cap Take 2.5 capsules (5,000 Units total) by mouth.      Multiple Vitamin (MULTIVITAMINS OR) Take 500 tablets by mouth.       Allergies[1]  Family History   Problem Relation Age of Onset    Heart Disease Father         CAD    Heart Disorder Father 45        Extensive Cardiac History    Other (Other) Father     Other (liver cancer) Father     Cancer Father         Liver cancer    Crohn's Disease Mother          at age 60 (cause of death)    Hypertension Mother         Non compliant BP control    No Known Problems Son     No Known Problems Son     Other (Other) Maternal Grandmother     Other (Other) Maternal Grandfather     Other (Other) Paternal Grandmother     Other (Other) Paternal Grandfather     Thyroid disease Other         close relative    Heart Disease Other         Close relative     Social History     Occupational History    Not on file   Tobacco Use    Smoking status: Never    Smokeless tobacco: Never   Vaping Use    Vaping status: Never Used   Substance and Sexual Activity    Alcohol use: Yes     Alcohol/week: 2.0 standard drinks of alcohol     Types: 2 Glasses of wine per week    Drug use: No    Sexual activity: Not on  file        ROS:  Complete ROS reviewed by me and non-contributory to the chief complaint except as mentioned above.    Physical Exam:    Ht 5' 7\" (1.702 m)   Wt 187 lb (84.8 kg)   LMP  (LMP Unknown)   BMI 29.29 kg/m²   Constitutional: Well developed, well nourished 64 year old female  Psychological: NAD, alert and appropriate  Respiratory: Breathing comfortably on room air with RR of 10-14  Cardiac: Palpable distal pulses with pink warm extremities  Lower extremities: Inspection of the knees reveals no discoloration and intact overlying skin with trace varus alignment.  On palpation, there is no significant effusion and significant medial tibiofemoral and patellofemoral joint line tenderness.  Range of motion demonstrates mild flexion contracture and 110 degrees of flexion on the left compared to 120 on the right.  Adequate stability is palpable on varus valgus stress.  Lachman and posterior drawer are both negative.  Popliteal space is nontender with no obvious masses.  Distal pedal pulses are palpable with intact motor function.  Sensation to light touch is intact about the distal dermatomes.  No significant pedal edema bilaterally.      Imaging:     Multiple views left and right knees including weightbearing images from 1/8/2024, independently interpreted and radiology report read.  Advanced medial more than lateral osteoarthritic narrowing is noted bilaterally with moderate patellofemoral disease.      XR KNEE, COMPLETE (4 OR MORE VIEWS), LEFT (CPT=73564)     Result Date: 1/8/2024  CONCLUSION:         1. Advanced osteoarthritis left knee.    Dictated by (CST): Cole Hudson MD on 1/08/2024 at 4:31 PM     Finalized by (CST): Cole Hudson MD on 1/08/2024 at 4:31 PM           XR KNEE, COMPLETE (4 OR MORE VIEWS), RIGHT (CPT=73564)     Result Date: 1/8/2024  CONCLUSION:         1. Advanced osteoarthritis right knee.    Dictated by (CST): Cole Hudson MD on 1/08/2024 at 4:29 PM     Finalized by (CST): Tristan  Cole PEARL MD on 1/08/2024 at 4:30 PM              Assessment/Diagnoses:  There are no diagnoses linked to this encounter.      Plan:  I reviewed imaging and exam findings with the patient.  The patient is struggling with persistent symptoms from knee osteoarthritis despite exhaustive nonoperative care.  Activity protection, medications, injections have not provided adequate relief of chronic symptoms.  We therefore discussed options for total knee arthroplasty, which should provide a long-term improvement for pain and functional deficits.  We had a long discussion regarding the nature and risks of surgical intervention including the typical postoperative course and recovery time.  With the assistance of plain x-rays, I outlined the areas of greatest arthritic change which would be resurfaced with an artificial knee composed of metal alloy and high density polyethylene.  She did express nickel sensitivity and inquired about whether the implant would be made out of titanium.  This may be required in her situation if she is found to have nickel sensitivity.  Risk, benefits and alternatives to surgery were discussed including but not limited to possible infection, bleeding requiring transfusion, neurovascular injury, persistent pain or instability, loss of motion, mechanical clicking and popping, implant related failure, and loosening over time.  The patient was educated on the finite nature of knee arthroplasty in terms of its longevity.  Activities appropriate for a total knee arthroplasty were reviewed along with restrictions.  Finally, risks of anesthesia were reviewed including but not limited to possible cardiac, pulmonary or cerebrovascular complications any of which could be serious or life-threatening.  The patient expressed understanding and is considering proceeding at some point prior to her travel plans in September.  She inquired about a repeat injection today but I told her that if she is considering  surgery in the next 3 months, no injections should be administered.  Finally, she inquired about robotic techniques and minimally invasive incisions.  She has an upcoming outside opinion at [facility].  I told her frankly that we do not have robotic options at [facility] although it is possible that local surgery centers or [facility] may have this option available.  I therefore referred her to our adult reconstruction colleagues to discuss further her surgical treatment options.  All questions were answered and she expressed understanding and appreciation.    Laura Martins MD, FAAOS  Orthopaedic Surgery   Sports Medicine/Knee and Shoulder  [facility]  t: [phone]  f: [phone]           This document was partially prepared using Dragon Medical voice recognition software.  Although every attempt is made to correct errors during dictation, discrepancies may still exist.             [1]   Allergies  Allergen Reactions    Diphteria And Tetanus PALPITATIONS    Penicillins ANAPHYLAXIS    Penicillin G Procaine ANAPHYLAXIS    Tetanus-Diphtheria Toxoids Td PALPITATIONS

## 2025-03-03 ENCOUNTER — PATIENT MESSAGE (OUTPATIENT)
Dept: ORTHOPEDICS CLINIC | Facility: CLINIC | Age: 65
End: 2025-03-03

## (undated) NOTE — LETTER
AUTHORIZATION FOR SURGICAL OPERATION OR OTHER PROCEDURE    1.  I hereby authorize Dr. Brent Ramirez, and [address] [place] [address] staff assigned to my case to perform the following operation and/or procedure at the [address] [place] [address]:    Synvisc injection to Right Time:  ________ A. M.  P.M.        Patient Name:  ______________________________________________________  (please print)      Patient signature:  ___________________________________________________             Relationship to Patient:           []  Parent

## (undated) NOTE — LETTER
4/27/2018              Lindsey Travis        [address]         Dear Paige Yusuf,    Our records indicate that the lab tests ordered for you by Kerney Bence, MD  have not been done and you are due for an appointment.   If you ha

## (undated) NOTE — MR AVS SNAPSHOT
After Visit Summary   2023    Cecy Mary   MRN: [id number]           Visit Information     Date & Time  2023  8:00 AM Provider  [facility]  Gretchen Sanchez, Virtual Visit Dept. Phone  [phone]      Your Vitals Were     LMP    (LMP Unknown)             Allergies as of 2023  Review status set to In Progress on 2023       Noted Reaction Type Reactions    Penicillin G Procaine 2014    ANAPHYLAXIS    Tetanus-diphtheria Toxoids Td 2015    PALPITATIONS      Your Current Medications        Dosage    nirmatrelvir&ritonavir 300/100 20 x 150 MG & 10 x 100MG Oral Tablet Therapy Pack Take two nirmatrelvir tablets (300mg) with one ritonavir tablet (100mg) together twice daily for 5 days. albuterol 108 (90 Base) MCG/ACT Inhalation Aero Soln Inhale 2 puffs into the lungs every 4 (four) hours as needed for Wheezing. tobramycin 0.3 % Ophthalmic Solution Place 2 drops into both eyes every 4 (four) hours. lisdexamfetamine (VYVANSE) 50 MG Oral Cap Take 1 capsule (50 mg total) by mouth daily. lisdexamfetamine (VYVANSE) 50 MG Oral Cap Starting on 2023. Take 1 capsule (50 mg total) by mouth daily. lisdexamfetamine (VYVANSE) 50 MG Oral Cap Starting on 3/27/2023. Take 1 capsule (50 mg total) by mouth daily. lisdexamfetamine (VYVANSE) 50 MG Oral Cap () Take 1 capsule (50 mg total) by mouth daily. lisdexamfetamine (VYVANSE) 50 MG Oral Cap () Take 1 capsule (50 mg total) by mouth daily. atorvastatin 40 MG Oral Tab Take 1 tablet (40 mg total) by mouth nightly. sertraline 50 MG Oral Tab Take 1 tablet (50 mg total) by mouth daily. levothyroxine 112 MCG Oral Tab Take 1 tablet (112 mcg total) by mouth every morning before breakfast.    montelukast (SINGULAIR) 10 MG Oral Tab Take 1 tablet (10 mg total) by mouth daily.     Budesonide-Formoterol Fumarate (SYMBICORT) 80-4.5 MCG/ACT Inhalation Aerosol Inhale 2 puffs into the lungs 2 (two) times daily. lisdexamfetamine (VYVANSE) 50 MG Oral Cap () Take 1 capsule (50 mg total) by mouth daily. Lisdexamfetamine Dimesylate (VYVANSE) 50 MG Oral Chew Tab () Chew 50 mg by mouth daily. Lisdexamfetamine Dimesylate (VYVANSE) 50 MG Oral Chew Tab () Chew 50 mg by mouth daily. Lisdexamfetamine Dimesylate (VYVANSE) 50 MG Oral Chew Tab () Chew 50 mg by mouth daily. Budesonide-Formoterol Fumarate 80-4.5 MCG/ACT Inhalation Aerosol Inhale 2 puffs into the lungs 2 (two) times daily. Cholecalciferol (VITAMIN D) 50 MCG (2000 UT) Oral Cap Take by mouth. albuterol 108 (90 Base) MCG/ACT Inhalation Aero Soln Inhale 2 puffs into the lungs every 6 (six) hours as needed for Wheezing. ibuprofen 600 MG Oral Tab Take 200 mg by mouth every 6 (six) hours as needed for Pain. Multiple Vitamin (MULTIVITAMINS OR) Take 500 tablets by mouth. Diagnoses for This Visit    COVID-19   [2716336]  -  Primary           We Ordered the Following     Normal Orders This Visit    OL DIG E/M SVC 5-10 MIN [67283 CPT(R)]                 Did you know that [facility] primary care physicians now offer Video Visits through [address] for adult patients for a variety of conditions such as allergies, back pain and cold symptoms? Skip the drive and waiting room and online chat with a doctor face-to-face using your web-cam enabled computer or mobile device wherever you are. Video Visits cost $50 and can be paid hassle-free using a credit, debit, or health savings card. Not active on [employer]? Ask us how to get signed up today! If you receive a survey from [employer], please take a few minutes to complete it and provide feedback. We strive to deliver the best patient experience and are looking for ways to make improvements. Your feedback will help us do so. For more information on [employer], please visit [email or web address]           No text in SmartText No text in SmartText

## (undated) NOTE — MR AVS SNAPSHOT
[address]  [phone]               Thank you for choosing us for your health care visit with Misael Wright MD.  We are glad to serve you and happy to provide you with this summary of your visit. MULTIVITAMINS OR   Take 500 tablets by mouth. Sertraline HCl 25 MG Tabs   Take 1 tablet (25 mg total) by mouth daily.    Commonly known as:  ZOLOFT                Where to Get Your Medications      These medications were sent to [address]

## (undated) NOTE — LETTER
[facility], [address] [place] [address], [place]  [address]  [facility] Margarita Yusuf [address]  PH: [phone]  FAX: [facility]  MD Agustina Winter, MD Gary Bowers MD Joenathan Oto, MD, PhD    0

## (undated) NOTE — LETTER
AUTHORIZATION FOR SURGICAL OPERATION OR OTHER PROCEDURE    1.  I hereby authorize Dr. Ady Kim, and [facility] staff assigned to my case to perform the following operation and/or procedure at the [facility]:    ________________________________ Time:  ________ A. M.  P.M.        Patient Name:  ______________________________________________________  (please print)      Patient signature:  ___________________________________________________             Relationship to Patient:           []  Parent

## (undated) NOTE — MR AVS SNAPSHOT
[address]  [phone]               Thank you for choosing us for your health care visit with Abhilash Coto MD.  We are glad to serve you and happy to provide you with this summary of your visit. Scheduling Instructions     Wednesday January 25, 2017     Imaging:  US THYROID ([id number])    Instructions:   To schedule a test at any [facility], call Central Scheduling at [phone], Monday through Friday between 7:30am t Fully enjoy your food when eating. Don’t eat while distracted and slow down. Avoid over sized portions. Don’t eat while when you’re bored.      EAT THESE FOODS MORE OFTEN: EAT THESE FOODS LESS OFTEN:   Make half your plate fruits and vegetables Highly

## (undated) NOTE — LETTER
01/14/21        Lindsey Travis  [address] [facility] [address]      Dear Sunita Medina records indicate that you have outstanding lab work and or testing that was ordered for you and has not yet been completed:  Orders Placed This Encounter      Alejandra

## (undated) NOTE — IP AVS SNAPSHOT
[address]  [address] [facility] ~ [phone]                Discharge Summary   5/28/2017    Roddy Travis           Admission Information        Provider Department    5/28/2017 Jaimie Mena DO [facility] 5w Patient Instructions       Follow up with PCP as instructed. Immunization History as of 5/29/2017  Reviewed on 5/28/2017    No immunizations on file.       Recent Hematology Lab Results  (Last 3 results in the past 90 days)    WBC R Patient [address] to help you get signed up for insurance coverage. Patient [address] is a Federal Navigator program that can help with your Affordable Care Act coverage, as well as all types of Medicaid plans.   To get signed up and covere Levothyroxine Sodium 112 MCG Oral Tab         Use: Regulate metabolism and inflammation   Most common side effects:  Dizziness, swelling, appetite changes, weight changes, changes in sleep and alertness, palpitations   What to report to your healthcare te

## (undated) NOTE — LETTER
2/26/2024              Lindsey Travis        [address] [facility] [address]         To Whom It May Concern,    Lindsey Travis is under my medical care. A copy of current medications is enclosed.    Demographics      Lindsey Travis   [id number]   Female   10/17/1960 (63 year old)     Allergies  Review status set to Review Complete on 1/8/2024   Severity Reactions Comments   Diphteria And Tetanus High PALPITATIONS    Penicillin G Procaine Not Specified ANAPHYLAXIS    Tetanus-diphtheria Toxoids Td Not Specified PALPITATIONS      Medications  Current Medications as of February 26, 2024  Generic Name Brand Name Strength Route/ Site Freq.   Acetaminophen (Tab) Tylenol Extra Strength 500 MG Oral Take 1 tablet (500 mg total) by mouth every 6 (six) hours as needed for Pain.                Albuterol Sulfate (Aero Soln) Ventolin  (90 Base) MCG/ACT Inhalation Inhale 2 puffs into the lungs every 4 (four) hours as needed for Wheezing.      Atorvastatin Calcium (Tab) Lipitor 40 MG Oral Take 1 tablet (40 mg total) by mouth nightly.      Beclomethasone Diprop HFA (Aerosol, Breath Activated) Beclomethasone Diprop HFA 80 MCG/ACT Inhalation Inhale 2 puffs into the lungs daily.      Cholecalciferol (Cap) Vitamin D 50 MCG (2000 UT) Oral Take 2.5 capsules (5,000 Units total) by mouth.                Levothyroxine Sodium (Tab) Synthroid 112 MCG Oral Take 1 tablet (112 mcg total) by mouth every morning before breakfast.                Lisdexamfetamine Dimesylate (Cap) Vyvanse 30 MG Oral Take 1 capsule (30 mg total) by mouth daily.      Montelukast Sodium (Tab) Singulair 10 MG Oral Take 1 tablet (10 mg total) by mouth daily.      Multiple Vitamin   Oral Take 500 tablets by mouth.      Sertraline HCl (Tab) Zoloft 50 MG  Take 2 tablets for a total of 100mg           Preferred Pharmacy    [id number] - [place], [place] - [address] [facility] [address]  [phone], [phone] [address] [facility] [address] [facility] [address]   Phone:  [phone] Fax: [phone]   Hours: Not open 24 hours     Primary Care Provider    Primary Care Provider  Caterina Peck Phone  [phone]       Sincerely,    Caterina Peck MD  [address] [id number] [phone]        Document electronically generated by:  Neli LUCAS CMA

## (undated) NOTE — LETTER
2/13/2023          To Whom It May Concern:    Juan Austin is currently under my medical care. I recommend Hollie Carr returns for half days for the next week until fully recovered from Covid 19 infection. If you require additional information please contact our office.         Sincerely,    Anali Monroe MD          Document generated by:  Anali Monroe MD

## (undated) NOTE — LETTER
Hospital Discharge Documentation    From: [address] Hospitalist's Office  Phone: [phone]    Patient discharged time/date: 5/29/2017  3:04 PM  Patient discharge disposition:  Home or Self Care  See below for history and physical.  No discharge sum   Comment  X 2      TONSILLECTOMY    1972      Family History    Problem  Relation  Age of Onset    •  Heart Disease  Father          CAD    •  Other[other] [OTHER]  Father      •  Heart Disorder  Father  45        Cardiac stents    •  Crohn's Disease  Mot ROS reviewed as documented in chart    Physical Exam:  Temp:  [98 °F (36.7 °C)] 98 °F (36.7 °C)  Pulse:  [64-85] 64  Resp:  [16-20] 18  BP: (121-143)/(80-85) 121/80 mmHg    General:  Alert and oriented. Diffuse skin problem:  None.   Eye:  Pupils are equal Subcutaneous heparin    CODE STATUS  Full    Primary care physician  Negrito Wilcox MD    Disposition  Clinical course will dictate outcome      Shadi Perea MD  5/28/2017  2:44 PM                  Electronically signed by Jamir Serna MD at 5/28/2